# Patient Record
Sex: FEMALE | Race: WHITE | Employment: OTHER | URBAN - METROPOLITAN AREA
[De-identification: names, ages, dates, MRNs, and addresses within clinical notes are randomized per-mention and may not be internally consistent; named-entity substitution may affect disease eponyms.]

---

## 2018-04-25 ENCOUNTER — HOSPITAL ENCOUNTER (OUTPATIENT)
Dept: MAMMOGRAPHY | Age: 78
Discharge: HOME | End: 2018-04-27
Payer: MEDICARE

## 2018-04-25 ENCOUNTER — HOSPITAL ENCOUNTER (OUTPATIENT)
Dept: GENERAL RADIOLOGY | Age: 78
Discharge: HOME | End: 2018-04-27
Payer: MEDICARE

## 2018-04-25 ENCOUNTER — HOSPITAL ENCOUNTER (OUTPATIENT)
Age: 78
Discharge: HOME | End: 2018-04-27
Payer: MEDICARE

## 2018-04-25 DIAGNOSIS — M25.552 LEFT HIP PAIN: ICD-10-CM

## 2018-04-25 DIAGNOSIS — Z12.31 ENCOUNTER FOR SCREENING MAMMOGRAM FOR MALIGNANT NEOPLASM OF BREAST: ICD-10-CM

## 2018-04-25 PROCEDURE — 73502 X-RAY EXAM HIP UNI 2-3 VIEWS: CPT

## 2018-04-25 PROCEDURE — 77067 SCR MAMMO BI INCL CAD: CPT

## 2019-04-25 ENCOUNTER — HOSPITAL ENCOUNTER (OUTPATIENT)
Age: 79
Discharge: HOME | End: 2019-04-27
Payer: MEDICARE

## 2019-04-25 ENCOUNTER — HOSPITAL ENCOUNTER (OUTPATIENT)
Dept: MAMMOGRAPHY | Age: 79
Discharge: HOME | End: 2019-04-27
Payer: MEDICARE

## 2019-04-25 DIAGNOSIS — Z12.31 ENCOUNTER FOR SCREENING MAMMOGRAM FOR MALIGNANT NEOPLASM OF BREAST: ICD-10-CM

## 2019-04-25 PROCEDURE — 77067 SCR MAMMO BI INCL CAD: CPT

## 2019-04-29 ENCOUNTER — TELEPHONE (OUTPATIENT)
Dept: ONCOLOGY | Age: 79
End: 2019-04-29

## 2019-04-29 NOTE — TELEPHONE ENCOUNTER
Call to patient in reference to her mammogram performed at [address] [place] [address] on April 25, 2019. Instructed patient that the radiologist has recommended some additional breast imaging, in order to make a final determination/result. Informed her that a request for Rx has been faxed to the ordering physician. Once we receive the script a  from [facility] will contact her to schedule the additional imaging study/studies. Verbalizes understanding and is agreeable to proceed.        Gunjan Chairez RN, BSN, [address]

## 2019-05-02 ENCOUNTER — HOSPITAL ENCOUNTER (OUTPATIENT)
Dept: GENERAL RADIOLOGY | Age: 79
Discharge: HOME | End: 2019-05-04
Payer: MEDICARE

## 2019-05-02 DIAGNOSIS — R92.8 ABNORMAL MAMMOGRAM: ICD-10-CM

## 2019-05-02 DIAGNOSIS — N64.89 BREAST ASYMMETRY: ICD-10-CM

## 2019-05-02 PROCEDURE — 76642 ULTRASOUND BREAST LIMITED: CPT

## 2019-05-02 PROCEDURE — G0279 TOMOSYNTHESIS, MAMMO: HCPCS

## 2019-05-09 ENCOUNTER — HOSPITAL ENCOUNTER (OUTPATIENT)
Age: 79
Discharge: HOME | DRG: 481 | End: 2019-05-09
Payer: MEDICARE

## 2019-05-09 LAB
BUN BLDV-MCNC: 19 MG/DL (ref 8–23)
CREAT SERPL-MCNC: 0.7 MG/DL (ref 0.5–1)
GFR AFRICAN AMERICAN: >60
GFR NON-AFRICAN AMERICAN: >60 ML/MIN/1.73

## 2019-05-09 PROCEDURE — 36415 COLL VENOUS BLD VENIPUNCTURE: CPT

## 2019-05-09 PROCEDURE — 82565 ASSAY OF CREATININE: CPT

## 2019-05-09 PROCEDURE — 84520 ASSAY OF UREA NITROGEN: CPT

## 2019-05-11 ENCOUNTER — HOSPITAL ENCOUNTER (INPATIENT)
Age: 79
LOS: 3 days | Discharge: NURSING, REHAB OR LONG-TERM CARE | DRG: 481 | End: 2019-05-14
Attending: EMERGENCY MEDICINE | Admitting: INTERNAL MEDICINE
Payer: MEDICARE

## 2019-05-11 ENCOUNTER — ANESTHESIA EVENT (OUTPATIENT)
Dept: OPERATING ROOM | Age: 79
DRG: 481 | End: 2019-05-11
Payer: MEDICARE

## 2019-05-11 ENCOUNTER — APPOINTMENT (OUTPATIENT)
Dept: GENERAL RADIOLOGY | Age: 79
DRG: 481 | End: 2019-05-11
Payer: MEDICARE

## 2019-05-11 DIAGNOSIS — S72.141A CLOSED DISPLACED INTERTROCHANTERIC FRACTURE OF RIGHT FEMUR, INITIAL ENCOUNTER (HCC): Primary | ICD-10-CM

## 2019-05-11 PROBLEM — I10 HYPERTENSION: Chronic | Status: ACTIVE | Noted: 2019-05-11

## 2019-05-11 LAB
ABO/RH: NORMAL
ANION GAP SERPL CALCULATED.3IONS-SCNC: 12 MMOL/L (ref 7–16)
ANTIBODY SCREEN: NORMAL
BASOPHILS ABSOLUTE: 0.04 E9/L (ref 0–0.2)
BASOPHILS RELATIVE PERCENT: 0.5 % (ref 0–2)
BUN BLDV-MCNC: 21 MG/DL (ref 8–23)
CALCIUM SERPL-MCNC: 9.1 MG/DL (ref 8.6–10.2)
CHLORIDE BLD-SCNC: 100 MMOL/L (ref 98–107)
CO2: 25 MMOL/L (ref 22–29)
CREAT SERPL-MCNC: 0.6 MG/DL (ref 0.5–1)
EKG ATRIAL RATE: 73 BPM
EKG P AXIS: 66 DEGREES
EKG P-R INTERVAL: 152 MS
EKG Q-T INTERVAL: 408 MS
EKG QRS DURATION: 86 MS
EKG QTC CALCULATION (BAZETT): 449 MS
EKG R AXIS: 1 DEGREES
EKG T AXIS: 14 DEGREES
EKG VENTRICULAR RATE: 73 BPM
EOSINOPHILS ABSOLUTE: 0.08 E9/L (ref 0.05–0.5)
EOSINOPHILS RELATIVE PERCENT: 1.1 % (ref 0–6)
GFR AFRICAN AMERICAN: >60
GFR NON-AFRICAN AMERICAN: >60 ML/MIN/1.73
GLUCOSE BLD-MCNC: 109 MG/DL (ref 74–99)
HCT VFR BLD CALC: 46.1 % (ref 34–48)
HEMOGLOBIN: 15.3 G/DL (ref 11.5–15.5)
IMMATURE GRANULOCYTES #: 0.05 E9/L
IMMATURE GRANULOCYTES %: 0.7 % (ref 0–5)
INR BLD: 0.9
LYMPHOCYTES ABSOLUTE: 1.82 E9/L (ref 1.5–4)
LYMPHOCYTES RELATIVE PERCENT: 24 % (ref 20–42)
MCH RBC QN AUTO: 34.2 PG (ref 26–35)
MCHC RBC AUTO-ENTMCNC: 33.2 % (ref 32–34.5)
MCV RBC AUTO: 102.9 FL (ref 80–99.9)
MONOCYTES ABSOLUTE: 0.65 E9/L (ref 0.1–0.95)
MONOCYTES RELATIVE PERCENT: 8.6 % (ref 2–12)
NEUTROPHILS ABSOLUTE: 4.93 E9/L (ref 1.8–7.3)
NEUTROPHILS RELATIVE PERCENT: 65.1 % (ref 43–80)
PDW BLD-RTO: 13 FL (ref 11.5–15)
PLATELET # BLD: 302 E9/L (ref 130–450)
PMV BLD AUTO: 9.8 FL (ref 7–12)
POTASSIUM REFLEX MAGNESIUM: 5 MMOL/L (ref 3.5–5)
PROTHROMBIN TIME: 10.3 SEC (ref 9.3–12.4)
RBC # BLD: 4.48 E12/L (ref 3.5–5.5)
SODIUM BLD-SCNC: 137 MMOL/L (ref 132–146)
WBC # BLD: 7.6 E9/L (ref 4.5–11.5)

## 2019-05-11 PROCEDURE — 2580000003 HC RX 258: Performed by: INTERNAL MEDICINE

## 2019-05-11 PROCEDURE — 85025 COMPLETE CBC W/AUTO DIFF WBC: CPT

## 2019-05-11 PROCEDURE — 80048 BASIC METABOLIC PNL TOTAL CA: CPT

## 2019-05-11 PROCEDURE — 86850 RBC ANTIBODY SCREEN: CPT

## 2019-05-11 PROCEDURE — 6370000000 HC RX 637 (ALT 250 FOR IP): Performed by: ORTHOPAEDIC SURGERY

## 2019-05-11 PROCEDURE — 6360000002 HC RX W HCPCS: Performed by: ORTHOPAEDIC SURGERY

## 2019-05-11 PROCEDURE — 1200000000 HC SEMI PRIVATE

## 2019-05-11 PROCEDURE — 93005 ELECTROCARDIOGRAM TRACING: CPT | Performed by: STUDENT IN AN ORGANIZED HEALTH CARE EDUCATION/TRAINING PROGRAM

## 2019-05-11 PROCEDURE — 87081 CULTURE SCREEN ONLY: CPT

## 2019-05-11 PROCEDURE — 85610 PROTHROMBIN TIME: CPT

## 2019-05-11 PROCEDURE — 36415 COLL VENOUS BLD VENIPUNCTURE: CPT

## 2019-05-11 PROCEDURE — 6370000000 HC RX 637 (ALT 250 FOR IP): Performed by: INTERNAL MEDICINE

## 2019-05-11 PROCEDURE — 6360000002 HC RX W HCPCS: Performed by: EMERGENCY MEDICINE

## 2019-05-11 PROCEDURE — 86901 BLOOD TYPING SEROLOGIC RH(D): CPT

## 2019-05-11 PROCEDURE — 93010 ELECTROCARDIOGRAM REPORT: CPT | Performed by: INTERNAL MEDICINE

## 2019-05-11 PROCEDURE — 86900 BLOOD TYPING SEROLOGIC ABO: CPT

## 2019-05-11 PROCEDURE — 51702 INSERT TEMP BLADDER CATH: CPT

## 2019-05-11 PROCEDURE — 73502 X-RAY EXAM HIP UNI 2-3 VIEWS: CPT

## 2019-05-11 PROCEDURE — 99285 EMERGENCY DEPT VISIT HI MDM: CPT

## 2019-05-11 PROCEDURE — 71045 X-RAY EXAM CHEST 1 VIEW: CPT

## 2019-05-11 RX ORDER — HYDROCODONE BITARTRATE AND ACETAMINOPHEN 5; 325 MG/1; MG/1
0.5 TABLET ORAL EVERY 4 HOURS PRN
Status: DISCONTINUED | OUTPATIENT
Start: 2019-05-11 | End: 2019-05-14 | Stop reason: HOSPADM

## 2019-05-11 RX ORDER — SODIUM CHLORIDE 0.9 % (FLUSH) 0.9 %
10 SYRINGE (ML) INJECTION PRN
Status: DISCONTINUED | OUTPATIENT
Start: 2019-05-11 | End: 2019-05-14 | Stop reason: HOSPADM

## 2019-05-11 RX ORDER — MORPHINE SULFATE 2 MG/ML
2 INJECTION, SOLUTION INTRAMUSCULAR; INTRAVENOUS EVERY 4 HOURS PRN
Status: DISCONTINUED | OUTPATIENT
Start: 2019-05-11 | End: 2019-05-11

## 2019-05-11 RX ORDER — BUPROPION HYDROCHLORIDE 150 MG/1
150 TABLET ORAL EVERY MORNING
COMMUNITY

## 2019-05-11 RX ORDER — MORPHINE SULFATE 2 MG/ML
1 INJECTION, SOLUTION INTRAMUSCULAR; INTRAVENOUS
Status: DISCONTINUED | OUTPATIENT
Start: 2019-05-11 | End: 2019-05-14 | Stop reason: HOSPADM

## 2019-05-11 RX ORDER — MORPHINE SULFATE 2 MG/ML
4 INJECTION, SOLUTION INTRAMUSCULAR; INTRAVENOUS ONCE
Status: COMPLETED | OUTPATIENT
Start: 2019-05-11 | End: 2019-05-11

## 2019-05-11 RX ORDER — SODIUM CHLORIDE 0.9 % (FLUSH) 0.9 %
10 SYRINGE (ML) INJECTION EVERY 12 HOURS SCHEDULED
Status: DISCONTINUED | OUTPATIENT
Start: 2019-05-11 | End: 2019-05-14 | Stop reason: HOSPADM

## 2019-05-11 RX ORDER — AMLODIPINE BESYLATE 5 MG/1
5 TABLET ORAL DAILY
Status: DISCONTINUED | OUTPATIENT
Start: 2019-05-11 | End: 2019-05-13

## 2019-05-11 RX ORDER — LISINOPRIL 10 MG/1
10 TABLET ORAL DAILY
Status: DISCONTINUED | OUTPATIENT
Start: 2019-05-11 | End: 2019-05-12

## 2019-05-11 RX ORDER — BENAZEPRIL HYDROCHLORIDE 10 MG/1
10 TABLET ORAL DAILY
Status: ON HOLD | COMMUNITY
End: 2019-05-14 | Stop reason: HOSPADM

## 2019-05-11 RX ORDER — BUPROPION HYDROCHLORIDE 150 MG/1
150 TABLET ORAL EVERY MORNING
Status: DISCONTINUED | OUTPATIENT
Start: 2019-05-11 | End: 2019-05-14 | Stop reason: HOSPADM

## 2019-05-11 RX ORDER — BENAZEPRIL HYDROCHLORIDE 10 MG/1
10 TABLET ORAL DAILY
Status: DISCONTINUED | OUTPATIENT
Start: 2019-05-11 | End: 2019-05-11 | Stop reason: CLARIF

## 2019-05-11 RX ORDER — ACETAMINOPHEN 325 MG/1
650 TABLET ORAL EVERY 4 HOURS PRN
Status: DISCONTINUED | OUTPATIENT
Start: 2019-05-11 | End: 2019-05-14 | Stop reason: HOSPADM

## 2019-05-11 RX ORDER — SODIUM CHLORIDE 9 MG/ML
INJECTION, SOLUTION INTRAVENOUS CONTINUOUS
Status: DISCONTINUED | OUTPATIENT
Start: 2019-05-11 | End: 2019-05-14 | Stop reason: HOSPADM

## 2019-05-11 RX ORDER — ERGOCALCIFEROL 1.25 MG/1
50000 CAPSULE ORAL ONCE
Status: COMPLETED | OUTPATIENT
Start: 2019-05-11 | End: 2019-05-11

## 2019-05-11 RX ORDER — HYDROCODONE BITARTRATE AND ACETAMINOPHEN 5; 325 MG/1; MG/1
1 TABLET ORAL EVERY 4 HOURS PRN
Status: DISCONTINUED | OUTPATIENT
Start: 2019-05-11 | End: 2019-05-14 | Stop reason: HOSPADM

## 2019-05-11 RX ORDER — ONDANSETRON 2 MG/ML
4 INJECTION INTRAMUSCULAR; INTRAVENOUS EVERY 6 HOURS PRN
Status: DISCONTINUED | OUTPATIENT
Start: 2019-05-11 | End: 2019-05-14 | Stop reason: HOSPADM

## 2019-05-11 RX ORDER — MORPHINE SULFATE 2 MG/ML
2 INJECTION, SOLUTION INTRAMUSCULAR; INTRAVENOUS
Status: DISCONTINUED | OUTPATIENT
Start: 2019-05-11 | End: 2019-05-14 | Stop reason: HOSPADM

## 2019-05-11 RX ORDER — AMLODIPINE BESYLATE 5 MG/1
5 TABLET ORAL DAILY
COMMUNITY

## 2019-05-11 RX ADMIN — BUPROPION HYDROCHLORIDE 150 MG: 150 TABLET, FILM COATED, EXTENDED RELEASE ORAL at 13:06

## 2019-05-11 RX ADMIN — SODIUM CHLORIDE: 9 INJECTION, SOLUTION INTRAVENOUS at 13:06

## 2019-05-11 RX ADMIN — ERGOCALCIFEROL 50000 UNITS: 1.25 CAPSULE ORAL at 21:16

## 2019-05-11 RX ADMIN — MORPHINE SULFATE 4 MG: 2 INJECTION, SOLUTION INTRAMUSCULAR; INTRAVENOUS at 10:34

## 2019-05-11 RX ADMIN — HYDROCODONE BITARTRATE AND ACETAMINOPHEN 1 TABLET: 5; 325 TABLET ORAL at 15:24

## 2019-05-11 RX ADMIN — MORPHINE SULFATE 2 MG: 2 INJECTION, SOLUTION INTRAMUSCULAR; INTRAVENOUS at 23:57

## 2019-05-11 RX ADMIN — HYDROCODONE BITARTRATE AND ACETAMINOPHEN 1 TABLET: 5; 325 TABLET ORAL at 20:33

## 2019-05-11 RX ADMIN — AMLODIPINE BESYLATE 5 MG: 5 TABLET ORAL at 13:06

## 2019-05-11 RX ADMIN — LISINOPRIL 10 MG: 10 TABLET ORAL at 13:06

## 2019-05-11 RX ADMIN — SODIUM CHLORIDE: 9 INJECTION, SOLUTION INTRAVENOUS at 23:48

## 2019-05-11 SDOH — HEALTH STABILITY: MENTAL HEALTH: HOW OFTEN DO YOU HAVE A DRINK CONTAINING ALCOHOL?: NEVER

## 2019-05-11 ASSESSMENT — PAIN DESCRIPTION - FREQUENCY
FREQUENCY: CONTINUOUS
FREQUENCY: INTERMITTENT
FREQUENCY: CONTINUOUS
FREQUENCY: CONTINUOUS

## 2019-05-11 ASSESSMENT — PAIN DESCRIPTION - PAIN TYPE
TYPE: ACUTE PAIN

## 2019-05-11 ASSESSMENT — PAIN DESCRIPTION - LOCATION
LOCATION: HIP

## 2019-05-11 ASSESSMENT — ENCOUNTER SYMPTOMS
BACK PAIN: 0
DIARRHEA: 0
FACIAL SWELLING: 0
PHOTOPHOBIA: 0
NAUSEA: 0
CHEST TIGHTNESS: 0
VOMITING: 0
ABDOMINAL PAIN: 0
ABDOMINAL DISTENTION: 0
SHORTNESS OF BREATH: 0

## 2019-05-11 ASSESSMENT — PAIN DESCRIPTION - DESCRIPTORS
DESCRIPTORS: ACHING;DISCOMFORT;PRESSURE
DESCRIPTORS: DISCOMFORT;SHARP

## 2019-05-11 ASSESSMENT — PAIN DESCRIPTION - ONSET
ONSET: ON-GOING

## 2019-05-11 ASSESSMENT — PAIN - FUNCTIONAL ASSESSMENT
PAIN_FUNCTIONAL_ASSESSMENT: PREVENTS OR INTERFERES SOME ACTIVE ACTIVITIES AND ADLS

## 2019-05-11 ASSESSMENT — PAIN SCALES - GENERAL
PAINLEVEL_OUTOF10: 7
PAINLEVEL_OUTOF10: 6
PAINLEVEL_OUTOF10: 3
PAINLEVEL_OUTOF10: 0
PAINLEVEL_OUTOF10: 10
PAINLEVEL_OUTOF10: 2
PAINLEVEL_OUTOF10: 0
PAINLEVEL_OUTOF10: 6

## 2019-05-11 ASSESSMENT — PAIN DESCRIPTION - ORIENTATION
ORIENTATION: RIGHT

## 2019-05-11 ASSESSMENT — PAIN DESCRIPTION - PROGRESSION
CLINICAL_PROGRESSION: GRADUALLY IMPROVING
CLINICAL_PROGRESSION: GRADUALLY WORSENING
CLINICAL_PROGRESSION: GRADUALLY WORSENING

## 2019-05-11 NOTE — ED PROVIDER NOTES
Delfino Woodward is an [de-identified]year old female who presents by EMS following mechanical fall outside eye clinic. Patient was walking with her  when she tripped over step and fell on her right hip. Patient is having pain at right hip with leg movement. Patient does not have pain at rest. Patient was not given pain medication. Patient has a history of hypertension. Patient is not on anticoagulation. Patient did not lose consciousness or hit her head. Patient denies any nausea vomiting. The history is provided by the patient, the spouse and the EMS personnel. Fall   The accident occurred less than 1 hour ago. The fall occurred while walking. She fell from a height of 3 to 5 ft. She landed on concrete. The point of impact was the right hip. The pain is present in the right hip. The pain is at a severity of 2/10. The pain is mild. She was not ambulatory at the scene. There was no entrapment after the fall. There was no drug use involved in the accident. There was no alcohol use involved in the accident. Pertinent negatives include no fever, no numbness, no abdominal pain, no nausea, no vomiting, no headaches and no loss of consciousness. The symptoms are aggravated by pressure on the injury and use of the injured limb. Treatment on scene includes a backboard. She has tried immobilization for the symptoms. The treatment provided moderate relief. Review of Systems   Constitutional: Negative for chills, diaphoresis, fever and unexpected weight change. HENT: Negative for congestion and facial swelling. Eyes: Negative for photophobia and visual disturbance. Respiratory: Negative for chest tightness and shortness of breath. Cardiovascular: Negative for chest pain, palpitations and leg swelling. Gastrointestinal: Negative for abdominal distention, abdominal pain, diarrhea, nausea and vomiting. Genitourinary: Negative for difficulty urinating, dysuria and flank pain.    Musculoskeletal: Positive for arthralgias. Negative for back pain, neck pain and neck stiffness. Neurological: Negative for dizziness, loss of consciousness, light-headedness, numbness and headaches. Physical Exam   Constitutional: She is oriented to person, place, and time. She appears well-developed and well-nourished. No distress. HENT:   Head: Normocephalic and atraumatic. Right Ear: External ear normal.   Left Ear: External ear normal.   Mouth/Throat: Oropharynx is clear and moist.   Eyes: Pupils are equal, round, and reactive to light. Conjunctivae and EOM are normal.   Neck: Neck supple. Cardiovascular: Normal rate and regular rhythm. Pulses:       Dorsalis pedis pulses are 2+ on the right side, and 2+ on the left side. Posterior tibial pulses are 2+ on the right side, and 2+ on the left side. Pulmonary/Chest: Effort normal and breath sounds normal. No respiratory distress. She has no wheezes. She exhibits no tenderness. Abdominal: Soft. Bowel sounds are normal. She exhibits no distension. There is no tenderness. There is no rebound and no guarding. Musculoskeletal: She exhibits tenderness and deformity. She exhibits no edema. Right leg shortened and externally rotated  No pain to palpation of right knee or ankle  Pulses, sensory, and motor intact bilaterally  No LE edema   Neurological: She is alert and oriented to person, place, and time. No cranial nerve deficit or sensory deficit. Skin: Skin is warm and dry. Capillary refill takes less than 2 seconds. She is not diaphoretic. Psychiatric: She has a normal mood and affect. Her behavior is normal.   Nursing note and vitals reviewed. Procedures    MDM  Number of Diagnoses or Management Options  Closed displaced intertrochanteric fracture of right femur, initial encounter [facility]):   Diagnosis management comments: Joey Cunha presented with right leg deformity and pain following fall.  Patient was found to have comminuted intertrochanteric fx of right proximal femur. Patient will be admitted to medicine. Ortho consulted. Discussed results and plan with patient, daughter, and . Patient's daughter works at [id number] and initially wanted patient transferred. Daughter spoke with staff at [place] [id number] and decided to have her mother stay for admission at [facility]. Patient did not want pain medication. Questions were answered and XR reviewed with family. ED Course as of May 11 0952   Sat May 11, 2019   7840 Patient did not want pain medication    [SS]   3859 Discussed results with patient, she understands, patient does not want pain medication    [SS]      ED Course User Index  [SS] Maira Corona MD       --------------------------------------------- PAST HISTORY ---------------------------------------------  Past Medical History:  has a past medical history of Depression and Hypertension. Past Surgical History:  has no past surgical history on file. Social History:  reports that she has never smoked. She has never used smokeless tobacco. She reports that she does not drink alcohol or use drugs. Family History: family history is not on file. The patients home medications have been reviewed. Allergies: Patient has no known allergies.     -------------------------------------------------- RESULTS -------------------------------------------------    LABS:  Results for orders placed or performed during the hospital encounter of 05/11/19   CBC Auto Differential   Result Value Ref Range    WBC 7.6 4.5 - 11.5 E9/L    RBC 4.48 3.50 - 5.50 E12/L    Hemoglobin 15.3 11.5 - 15.5 g/dL    Hematocrit 46.1 34.0 - 48.0 %    .9 (H) 80.0 - 99.9 fL    MCH 34.2 26.0 - 35.0 pg    MCHC 33.2 32.0 - 34.5 %    RDW 13.0 11.5 - 15.0 fL    Platelets 639 243 - 496 E9/L    MPV 9.8 7.0 - 12.0 fL    Neutrophils % 65.1 43.0 - 80.0 %    Immature Granulocytes % 0.7 0.0 - 5.0 %    Lymphocytes % 24.0 20.0 - 42.0 %    Monocytes % 8.6 2.0 - 12.0 %    Eosinophils % 1.1 0.0 - 6.0 %    Basophils % 0.5 0.0 - 2.0 %    Neutrophils # 4.93 1.80 - 7.30 E9/L    Immature Granulocytes # 0.05 E9/L    Lymphocytes # 1.82 1.50 - 4.00 E9/L    Monocytes # 0.65 0.10 - 0.95 E9/L    Eosinophils # 0.08 0.05 - 0.50 E9/L    Basophils # 0.04 0.00 - 0.20 E9/L   EKG 12 Lead   Result Value Ref Range    Ventricular Rate 73 BPM    Atrial Rate 73 BPM    P-R Interval 152 ms    QRS Duration 86 ms    Q-T Interval 408 ms    QTc Calculation (Bazett) 449 ms    P Axis 66 degrees    R Axis 1 degrees    T Axis 14 degrees       RADIOLOGY:  XR HIP 2-3 VW W PELVIS RIGHT   Final Result   ALERT:  THIS IS AN ABNORMAL REPORT    1. Comminuted intertrochanteric fracture of the right proximal femur   with shearing of the lesser trochanter and with superior and lateral   distal fracture fragment migration         XR CHEST 1 VW   Final Result   Right hemidiaphragmatic elevation without otherwise   radiographic evidence of acute cardiopulmonary disease. Vascular   calcifications thoracic aorta. EKG: This EKG is signed and interpreted by me. Rate: 73  Rhythm: Sinus  Interpretation: non-specific EKG, possible left atrial enlargement  Comparison: no previous EKG      ------------------------- NURSING NOTES AND VITALS REVIEWED ---------------------------  Date / Time Roomed:  5/11/2019  8:34 AM  ED Bed Assignment:  26/26    The nursing notes within the ED encounter and vital signs as below have been reviewed.      Patient Vitals for the past 24 hrs:   BP Temp Temp src Pulse Resp SpO2 Height Weight   05/11/19 0842 121/88 97.8 °F (36.6 °C) Oral 74 20 98 % 5' 2\" (1.575 m) 130 lb (59 kg)       Oxygen Saturation Interpretation: Normal    ------------------------------------------ PROGRESS NOTES ------------------------------------------  Re-evaluation(s):  Time: 950am  Patients symptoms show no change  Repeat physical examination is not changed    Counseling:  I have spoken with the patient and discussed todays results, in addition to providing specific details for the plan of care and counseling regarding the diagnosis and prognosis. Their questions are answered at this time and they are agreeable with the plan of admission.    --------------------------------- ADDITIONAL PROVIDER NOTES ---------------------------------  Consultations:  Spoke with Dr. Shawn Vail. Discussed case. They will admit the patient. This patient's ED course included: a personal history and physicial examination, re-evaluation prior to disposition and multiple bedside re-evaluations    This patient has remained hemodynamically stable during their ED course. Diagnosis:  1. Closed displaced intertrochanteric fracture of right femur, initial encounter ([facility] [address]        Disposition:  Patient's disposition: Admit to med/surg floor  Patient's condition is stable.               Christian Walsh MD  05/12/19 9427

## 2019-05-11 NOTE — ANESTHESIA PRE PROCEDURE
Department of Anesthesiology  Preprocedure Note       Name:  Davion Saldivar   Age:  [de-identified] y.o.  :  1940                                          MRN:  [id number]         Date:  2019      Surgeon: Marilyn Raya):  Norma Orozco MD    Procedure: HIP OPEN REDUCTION INTERNAL FIXATION (Right )    Medications prior to admission:   Prior to Admission medications    Medication Sig Start Date End Date Taking?  Authorizing Provider   benazepril (LOTENSIN) 10 MG tablet Take 10 mg by mouth daily   Yes Historical Provider, MD   amLODIPine (NORVASC) 5 MG tablet Take 5 mg by mouth daily   Yes Historical Provider, MD   buPROPion (WELLBUTRIN XL) 150 MG extended release tablet Take 150 mg by mouth every morning   Yes Historical Provider, MD       Current medications:    Current Facility-Administered Medications   Medication Dose Route Frequency Provider Last Rate Last Dose    amLODIPine (NORVASC) tablet 5 mg  5 mg Oral Daily Cande Shields, DO   5 mg at 19 1306    buPROPion (WELLBUTRIN XL) extended release tablet 150 mg  150 mg Oral QAM Cande Shields, DO   150 mg at 19 1306    sodium chloride flush 0.9 % injection 10 mL  10 mL Intravenous 2 times per day Cande Shields, DO        sodium chloride flush 0.9 % injection 10 mL  10 mL Intravenous PRN Cande Shields, DO        magnesium hydroxide (MILK OF MAGNESIA) 400 MG/5ML suspension 30 mL  30 mL Oral Daily PRN Cande Shields, DO        ondansetron [facility]) injection 4 mg  4 mg Intravenous Q6H PRN Cande Shields, DO        0.9 % sodium chloride infusion   Intravenous Continuous Cande Shields, DO 75 mL/hr at 19 1306      acetaminophen (TYLENOL) tablet 650 mg  650 mg Oral Q4H PRN Cande Shields, DO        HYDROcodone-acetaminophen (NORCO) 5-325 MG per tablet 1 tablet  1 tablet Oral Q4H PRN Cande Shields, DO   1 tablet at 19 1524    lisinopril (PRINIVIL;ZESTRIL) tablet 10 mg  10 mg Oral Daily Cande Shields, DO   10 mg at 19 1306    HYDROcodone-acetaminophen (NORCO) 5-325 MG per tablet 0.5 tablet  0.5 tablet Oral Q4H PRN Viktoria Lee MD        morphine (PF) injection 2 mg  2 mg Intravenous Q3H PRN Viktoria Lee MD        morphine (PF) injection 1 mg  1 mg Intravenous Q3H PRN Viktoria Lee MD        vitamin D (ERGOCALCIFEROL) capsule 50,000 Units  50,000 Units Oral Once Viktoria Lee MD        ceFAZolin (ANCEF) 2 g in dextrose 5 % 100 mL IVPB  2 g Intravenous See Admin Instructions Viktoria Lee MD           Allergies:  No Known Allergies    Problem List:    Patient Active Problem List   Diagnosis Code    [facility] right femur[facility] S72.141A    Hypertension I10       Past Medical History:        Diagnosis Date    Depression     Hypertension        Past Surgical History:  History reviewed. No pertinent surgical history. Social History:    Social History     Tobacco Use    Smoking status: Never Smoker    Smokeless tobacco: Never Used   Substance Use Topics    Alcohol use: Never     Frequency: Never                                Counseling given: Not Answered      Vital Signs (Current):   Vitals:    05/11/19 0842 05/11/19 1027 05/11/19 1230   BP: 121/88 133/79 136/74   Pulse: 74 73 70   Resp: 20 16 16   Temp: 97.8 °F (36.6 °C) 98.1 °F (36.7 °C) 98.3 °F (36.8 °C)   TempSrc: Oral Oral    SpO2: 98% 95% 99%   Weight: 130 lb (59 kg)     Height: 5' 2\" (1.575 m)                                                BP Readings from Last 3 Encounters:   05/11/19 136/74       NPO Status:  greater than 8 hours                                                                               BMI:   Wt Readings from Last 3 Encounters:   05/11/19 130 lb (59 kg)     Body mass index is 23.78 kg/m².     CBC:   Lab Results   Component Value Date    WBC 7.6 05/11/2019    RBC 4.48 05/11/2019    HGB 15.3 05/11/2019    HCT 46.1 05/11/2019    .9 05/11/2019    RDW 13.0 05/11/2019     05/11/2019

## 2019-05-11 NOTE — H&P
[address] [address]                              HISTORY AND PHYSICAL    PATIENT NAME: Sammy Carmona                      :        1940  MED REC NO:   [id number]                            ROOM:       0228  ACCOUNT NO:   [de-identified]                           ADMIT DATE: 2019  PROVIDER:     Ron Wood DO    DATE OF ADMISSION:  2019. CHIEF COMPLAINT:  Right hip pain, status post fall. HISTORY OF PRESENT ILLNESS:  The patient is an 78-year-old   female, who was at [facility]. She fell getting out of the  car. She landed on the grass. She was seen in the emergency room. She  was noted to have a right intertrochanteric fracture of the hip. She  was admitted for further evaluation and treatment. PRIMARY CARE PHYSICIAN:  Tone Adams DO    MEDICATIONS PRIOR TO ADMISSION:  Lotensin, Norvasc, and Wellbutrin XL. PAST MEDICAL HISTORY:  Hypertension and depression. PAST SURGICAL HISTORY:  Tonsillectomy and bilateral eye cataract  surgery. REVIEW OF SYSTEMS:  Remarkable for above-stated chief complaint plus  allergies none known. SOCIAL HISTORY:  No tobacco.  No alcohol. PHYSICAL EXAMINATION:  GENERAL APPEARANCE:  Physical exam reveals an 78-year-old   female, who is alert and oriented x3, cooperative, and a good historian. VITAL SIGNS:  On admission as per emergency room record, which are  notable for a temperature of 97.8, pulse 74, respiration 20, blood  pressure 121/88. HEENT:  Head:  Normocephalic and atraumatic. Eyes:  Pupils equal and  reactive to light. Extraocular muscles intact. Fundi not well  visualized. Nose:  No obstruction, polyp, or discharge noted. Mouth:   Mucosa without lesion. Pharynx:  Non-injectable without exudate. NECK:  Supple. No JVD. No thyromegaly. No carotid bruits.   HEART:  Regular rate and rhythm without murmur. LUNGS:  Clear to auscultation bilaterally. ABDOMEN:  Positive bowel sounds. Soft and nontender. BACK:  Increased thoracic kyphosis. EXTREMITIES:  With minimal external rotation to the right lower  extremity. LYMPH NODES:  No adenopathy noticed. SKIN:  Without rash or lesions. IMPRESSION:  Right hip intertrochanteric fracture, hypertension, and  depression. PLAN:  Admit. Pain control. Ortho to see. DISCHARGE PLAN:  Home when stable versus rehab.         Yoni Kerr DO    D: 05/11/2019 12:40:51       T: 05/11/2019 13:44:31     [id number]  Job#: [id number]     Doc#: [id number]    CC:

## 2019-05-11 NOTE — PROGRESS NOTES
Dr. Janett Block called @ [phone]. Requested he be added to Treatment Team as he will be performing pt surgery on 05/12/19.  Added to Treatment Team.

## 2019-05-11 NOTE — CONSULTS
Cc r hip pain    Dx r intertroch fracture    Assuming orthopaedic care from Dr. Shyla Ceja internal fixation of right hip once medically optimizied  Will be wbat post op  Pre op orders placed    See dictated Note. Thank Giovanny Boykin MD    Patient seen and examined  No other injuries found    History reviewed. No pertinent surgical history. Past Medical History:   Diagnosis Date    Depression     Hypertension      Scheduled Meds:   amLODIPine  5 mg Oral Daily    buPROPion  150 mg Oral QAM    sodium chloride flush  10 mL Intravenous 2 times per day    lisinopril  10 mg Oral Daily     Continuous Infusions:   sodium chloride 75 mL/hr at 05/11/19 1306     PRN Meds:.sodium chloride flush, magnesium hydroxide, ondansetron, acetaminophen, HYDROcodone 5 mg - acetaminophen, morphine  . No Known Allergies    etoh neg  tob neg    Ros Denies change in bowel or bladder habits. Denies unexpected weight loss. Denies temp    famhx positive htn and dm,  Neg for cad and ca    Vitals:    05/11/19 1230   BP: 136/74   Pulse: 70   Resp: 16   Temp: 98.3 °F (36.8 °C)   SpO2: 99%     Risk, benefits and options discussed with patient. she has verbalized understanding of options. The possibility of complications were also discussed to include but not limited to nerve damage, infection, problems with healing, vascular injury, chronic pain, stiffness, dysfunction, repeat surgery, symptomatic hardware and or its failure, dislocation, and blood clot. Post-op care, work, activity and restrictions were also discussed with patient and they verbalized and agreed with the restrictions.

## 2019-05-12 ENCOUNTER — ANESTHESIA (OUTPATIENT)
Dept: OPERATING ROOM | Age: 79
DRG: 481 | End: 2019-05-12
Payer: MEDICARE

## 2019-05-12 ENCOUNTER — APPOINTMENT (OUTPATIENT)
Dept: GENERAL RADIOLOGY | Age: 79
DRG: 481 | End: 2019-05-12
Payer: MEDICARE

## 2019-05-12 VITALS — OXYGEN SATURATION: 100 % | SYSTOLIC BLOOD PRESSURE: 163 MMHG | TEMPERATURE: 95.4 F | DIASTOLIC BLOOD PRESSURE: 92 MMHG

## 2019-05-12 LAB
ANION GAP SERPL CALCULATED.3IONS-SCNC: 13 MMOL/L (ref 7–16)
BUN BLDV-MCNC: 22 MG/DL (ref 8–23)
CALCIUM SERPL-MCNC: 8.7 MG/DL (ref 8.6–10.2)
CHLORIDE BLD-SCNC: 96 MMOL/L (ref 98–107)
CO2: 25 MMOL/L (ref 22–29)
CREAT SERPL-MCNC: 0.5 MG/DL (ref 0.5–1)
GFR AFRICAN AMERICAN: >60
GFR NON-AFRICAN AMERICAN: >60 ML/MIN/1.73
GLUCOSE BLD-MCNC: 211 MG/DL (ref 74–99)
POTASSIUM SERPL-SCNC: 4.7 MMOL/L (ref 3.5–5)
SODIUM BLD-SCNC: 134 MMOL/L (ref 132–146)

## 2019-05-12 PROCEDURE — 73552 X-RAY EXAM OF FEMUR 2/>: CPT

## 2019-05-12 PROCEDURE — 6360000002 HC RX W HCPCS: Performed by: NURSE ANESTHETIST, CERTIFIED REGISTERED

## 2019-05-12 PROCEDURE — C1769 GUIDE WIRE: HCPCS | Performed by: ORTHOPAEDIC SURGERY

## 2019-05-12 PROCEDURE — 97116 GAIT TRAINING THERAPY: CPT

## 2019-05-12 PROCEDURE — 3700000000 HC ANESTHESIA ATTENDED CARE: Performed by: ORTHOPAEDIC SURGERY

## 2019-05-12 PROCEDURE — 6360000002 HC RX W HCPCS: Performed by: ANESTHESIOLOGY

## 2019-05-12 PROCEDURE — 3700000001 HC ADD 15 MINUTES (ANESTHESIA): Performed by: ORTHOPAEDIC SURGERY

## 2019-05-12 PROCEDURE — 2580000003 HC RX 258: Performed by: INTERNAL MEDICINE

## 2019-05-12 PROCEDURE — 7100000000 HC PACU RECOVERY - FIRST 15 MIN: Performed by: ORTHOPAEDIC SURGERY

## 2019-05-12 PROCEDURE — 2720000010 HC SURG SUPPLY STERILE: Performed by: ORTHOPAEDIC SURGERY

## 2019-05-12 PROCEDURE — C1713 ANCHOR/SCREW BN/BN,TIS/BN: HCPCS | Performed by: ORTHOPAEDIC SURGERY

## 2019-05-12 PROCEDURE — 6370000000 HC RX 637 (ALT 250 FOR IP): Performed by: ORTHOPAEDIC SURGERY

## 2019-05-12 PROCEDURE — 6360000002 HC RX W HCPCS: Performed by: ORTHOPAEDIC SURGERY

## 2019-05-12 PROCEDURE — 3209999900 FLUORO FOR SURGICAL PROCEDURES

## 2019-05-12 PROCEDURE — 2500000003 HC RX 250 WO HCPCS: Performed by: ORTHOPAEDIC SURGERY

## 2019-05-12 PROCEDURE — 97530 THERAPEUTIC ACTIVITIES: CPT

## 2019-05-12 PROCEDURE — 1200000000 HC SEMI PRIVATE

## 2019-05-12 PROCEDURE — 97161 PT EVAL LOW COMPLEX 20 MIN: CPT

## 2019-05-12 PROCEDURE — 2580000003 HC RX 258: Performed by: NURSE ANESTHETIST, CERTIFIED REGISTERED

## 2019-05-12 PROCEDURE — 80048 BASIC METABOLIC PNL TOTAL CA: CPT

## 2019-05-12 PROCEDURE — 2500000003 HC RX 250 WO HCPCS: Performed by: NURSE ANESTHETIST, CERTIFIED REGISTERED

## 2019-05-12 PROCEDURE — 6370000000 HC RX 637 (ALT 250 FOR IP): Performed by: INTERNAL MEDICINE

## 2019-05-12 PROCEDURE — 0QS636Z REPOSITION RIGHT UPPER FEMUR WITH INTRAMEDULLARY INTERNAL FIXATION DEVICE, PERCUTANEOUS APPROACH: ICD-10-PCS | Performed by: ORTHOPAEDIC SURGERY

## 2019-05-12 PROCEDURE — 36415 COLL VENOUS BLD VENIPUNCTURE: CPT

## 2019-05-12 PROCEDURE — 2709999900 HC NON-CHARGEABLE SUPPLY: Performed by: ORTHOPAEDIC SURGERY

## 2019-05-12 PROCEDURE — 7100000001 HC PACU RECOVERY - ADDTL 15 MIN: Performed by: ORTHOPAEDIC SURGERY

## 2019-05-12 PROCEDURE — 3600000003 HC SURGERY LEVEL 3 BASE: Performed by: ORTHOPAEDIC SURGERY

## 2019-05-12 PROCEDURE — 3600000013 HC SURGERY LEVEL 3 ADDTL 15MIN: Performed by: ORTHOPAEDIC SURGERY

## 2019-05-12 DEVICE — LOCKING SCREW, FULLY THREADED: Type: IMPLANTABLE DEVICE | Site: FEMUR | Status: FUNCTIONAL

## 2019-05-12 DEVICE — LONG NAIL KIT R1.5, TI, RIGHT
Type: IMPLANTABLE DEVICE | Site: FEMUR | Status: FUNCTIONAL
Brand: GAMMA

## 2019-05-12 DEVICE — LAG SCREW, TI
Type: IMPLANTABLE DEVICE | Site: FEMUR | Status: FUNCTIONAL
Brand: GAMMA

## 2019-05-12 RX ORDER — DOCUSATE SODIUM 100 MG/1
100 CAPSULE, LIQUID FILLED ORAL DAILY
Status: DISCONTINUED | OUTPATIENT
Start: 2019-05-12 | End: 2019-05-14 | Stop reason: HOSPADM

## 2019-05-12 RX ORDER — PROPOFOL 10 MG/ML
INJECTION, EMULSION INTRAVENOUS PRN
Status: DISCONTINUED | OUTPATIENT
Start: 2019-05-12 | End: 2019-05-12 | Stop reason: SDUPTHER

## 2019-05-12 RX ORDER — ASPIRIN 81 MG/1
81 TABLET ORAL DAILY
Status: DISCONTINUED | OUTPATIENT
Start: 2019-05-13 | End: 2019-05-14 | Stop reason: HOSPADM

## 2019-05-12 RX ORDER — ONDANSETRON 2 MG/ML
INJECTION INTRAMUSCULAR; INTRAVENOUS PRN
Status: DISCONTINUED | OUTPATIENT
Start: 2019-05-12 | End: 2019-05-12 | Stop reason: SDUPTHER

## 2019-05-12 RX ORDER — OXYCODONE HYDROCHLORIDE AND ACETAMINOPHEN 5; 325 MG/1; MG/1
1 TABLET ORAL
Status: DISCONTINUED | OUTPATIENT
Start: 2019-05-12 | End: 2019-05-12 | Stop reason: HOSPADM

## 2019-05-12 RX ORDER — FENTANYL CITRATE 50 UG/ML
50 INJECTION, SOLUTION INTRAMUSCULAR; INTRAVENOUS EVERY 5 MIN PRN
Status: DISCONTINUED | OUTPATIENT
Start: 2019-05-12 | End: 2019-05-12 | Stop reason: HOSPADM

## 2019-05-12 RX ORDER — DIPHENHYDRAMINE HYDROCHLORIDE 50 MG/ML
12.5 INJECTION INTRAMUSCULAR; INTRAVENOUS
Status: DISCONTINUED | OUTPATIENT
Start: 2019-05-12 | End: 2019-05-12 | Stop reason: HOSPADM

## 2019-05-12 RX ORDER — LIDOCAINE HYDROCHLORIDE 20 MG/ML
INJECTION, SOLUTION EPIDURAL; INFILTRATION; INTRACAUDAL; PERINEURAL PRN
Status: DISCONTINUED | OUTPATIENT
Start: 2019-05-12 | End: 2019-05-12 | Stop reason: SDUPTHER

## 2019-05-12 RX ORDER — HYDROCODONE BITARTRATE AND ACETAMINOPHEN 5; 325 MG/1; MG/1
.5-1 TABLET ORAL EVERY 6 HOURS PRN
Qty: 28 TABLET | Refills: 0 | Status: SHIPPED | OUTPATIENT
Start: 2019-05-12 | End: 2019-05-19

## 2019-05-12 RX ORDER — CEFAZOLIN SODIUM 1 G/50ML
1 SOLUTION INTRAVENOUS EVERY 8 HOURS
Status: COMPLETED | OUTPATIENT
Start: 2019-05-12 | End: 2019-05-13

## 2019-05-12 RX ORDER — LIDOCAINE HYDROCHLORIDE ANHYDROUS AND DEXTROSE MONOHYDRATE .4; 5 G/100ML; G/100ML
2 INJECTION, SOLUTION INTRAVENOUS CONTINUOUS
Status: DISCONTINUED | OUTPATIENT
Start: 2019-05-12 | End: 2019-05-12

## 2019-05-12 RX ORDER — MEPERIDINE HYDROCHLORIDE 25 MG/ML
12.5 INJECTION INTRAMUSCULAR; INTRAVENOUS; SUBCUTANEOUS EVERY 5 MIN PRN
Status: DISCONTINUED | OUTPATIENT
Start: 2019-05-12 | End: 2019-05-12 | Stop reason: HOSPADM

## 2019-05-12 RX ORDER — BUPIVACAINE HYDROCHLORIDE AND EPINEPHRINE 2.5; 5 MG/ML; UG/ML
INJECTION, SOLUTION EPIDURAL; INFILTRATION; INTRACAUDAL; PERINEURAL PRN
Status: DISCONTINUED | OUTPATIENT
Start: 2019-05-12 | End: 2019-05-12 | Stop reason: ALTCHOICE

## 2019-05-12 RX ORDER — DEXAMETHASONE SODIUM PHOSPHATE 4 MG/ML
INJECTION, SOLUTION INTRA-ARTICULAR; INTRALESIONAL; INTRAMUSCULAR; INTRAVENOUS; SOFT TISSUE PRN
Status: DISCONTINUED | OUTPATIENT
Start: 2019-05-12 | End: 2019-05-12 | Stop reason: SDUPTHER

## 2019-05-12 RX ORDER — SODIUM CHLORIDE, SODIUM LACTATE, POTASSIUM CHLORIDE, CALCIUM CHLORIDE 600; 310; 30; 20 MG/100ML; MG/100ML; MG/100ML; MG/100ML
INJECTION, SOLUTION INTRAVENOUS CONTINUOUS PRN
Status: DISCONTINUED | OUTPATIENT
Start: 2019-05-12 | End: 2019-05-12 | Stop reason: SDUPTHER

## 2019-05-12 RX ORDER — LATANOPROST 50 UG/ML
1 SOLUTION/ DROPS OPHTHALMIC DAILY
COMMUNITY

## 2019-05-12 RX ORDER — ASPIRIN 81 MG/1
81 TABLET ORAL DAILY
Qty: 30 TABLET | Refills: 0 | Status: SHIPPED | OUTPATIENT
Start: 2019-05-12

## 2019-05-12 RX ORDER — FENTANYL CITRATE 50 UG/ML
25 INJECTION, SOLUTION INTRAMUSCULAR; INTRAVENOUS EVERY 5 MIN PRN
Status: DISCONTINUED | OUTPATIENT
Start: 2019-05-12 | End: 2019-05-12 | Stop reason: HOSPADM

## 2019-05-12 RX ORDER — PROMETHAZINE HYDROCHLORIDE 25 MG/ML
6.25 INJECTION, SOLUTION INTRAMUSCULAR; INTRAVENOUS
Status: DISCONTINUED | OUTPATIENT
Start: 2019-05-12 | End: 2019-05-12 | Stop reason: HOSPADM

## 2019-05-12 RX ORDER — BUPIVACAINE HYDROCHLORIDE 7.5 MG/ML
INJECTION, SOLUTION INTRASPINAL PRN
Status: DISCONTINUED | OUTPATIENT
Start: 2019-05-12 | End: 2019-05-12 | Stop reason: SDUPTHER

## 2019-05-12 RX ORDER — PROPOFOL 10 MG/ML
INJECTION, EMULSION INTRAVENOUS CONTINUOUS PRN
Status: DISCONTINUED | OUTPATIENT
Start: 2019-05-12 | End: 2019-05-12 | Stop reason: SDUPTHER

## 2019-05-12 RX ORDER — KETAMINE HYDROCHLORIDE 10 MG/ML
INJECTION, SOLUTION INTRAMUSCULAR; INTRAVENOUS PRN
Status: DISCONTINUED | OUTPATIENT
Start: 2019-05-12 | End: 2019-05-12 | Stop reason: SDUPTHER

## 2019-05-12 RX ORDER — LATANOPROST 50 UG/ML
1 SOLUTION/ DROPS OPHTHALMIC DAILY
Status: DISCONTINUED | OUTPATIENT
Start: 2019-05-12 | End: 2019-05-14 | Stop reason: HOSPADM

## 2019-05-12 RX ADMIN — BUPIVACAINE HYDROCHLORIDE IN DEXTROSE 15 MG: 7.5 INJECTION, SOLUTION SUBARACHNOID at 07:54

## 2019-05-12 RX ADMIN — PROPOFOL 20 MG: 10 INJECTION, EMULSION INTRAVENOUS at 07:49

## 2019-05-12 RX ADMIN — LIDOCAINE HYDROCHLORIDE 2 MG/MIN: 4 INJECTION, SOLUTION INTRAVENOUS at 08:46

## 2019-05-12 RX ADMIN — LATANOPROST 1 DROP: 50 SOLUTION OPHTHALMIC at 13:54

## 2019-05-12 RX ADMIN — DEXAMETHASONE SODIUM PHOSPHATE 10 MG: 4 INJECTION, SOLUTION INTRAMUSCULAR; INTRAVENOUS at 08:06

## 2019-05-12 RX ADMIN — PHENYLEPHRINE HYDROCHLORIDE 50 MCG/MIN: 10 INJECTION INTRAVENOUS at 08:16

## 2019-05-12 RX ADMIN — MORPHINE SULFATE 2 MG: 2 INJECTION, SOLUTION INTRAMUSCULAR; INTRAVENOUS at 04:15

## 2019-05-12 RX ADMIN — LIDOCAINE HYDROCHLORIDE 60 MG: 20 INJECTION, SOLUTION EPIDURAL; INFILTRATION; INTRACAUDAL; PERINEURAL at 08:08

## 2019-05-12 RX ADMIN — SODIUM CHLORIDE, POTASSIUM CHLORIDE, SODIUM LACTATE AND CALCIUM CHLORIDE: 600; 310; 30; 20 INJECTION, SOLUTION INTRAVENOUS at 07:43

## 2019-05-12 RX ADMIN — Medication 2 G: at 07:44

## 2019-05-12 RX ADMIN — KETAMINE HYDROCHLORIDE 10 MG: 10 INJECTION INTRAMUSCULAR; INTRAVENOUS at 08:09

## 2019-05-12 RX ADMIN — Medication 10 ML: at 04:17

## 2019-05-12 RX ADMIN — ONDANSETRON HYDROCHLORIDE 4 MG: 2 INJECTION, SOLUTION INTRAMUSCULAR; INTRAVENOUS at 08:06

## 2019-05-12 RX ADMIN — PROPOFOL 50 MCG/KG/MIN: 10 INJECTION, EMULSION INTRAVENOUS at 07:59

## 2019-05-12 RX ADMIN — PROPOFOL 30 MG: 10 INJECTION, EMULSION INTRAVENOUS at 07:52

## 2019-05-12 RX ADMIN — CEFAZOLIN SODIUM 1 G: 1 SOLUTION INTRAVENOUS at 15:32

## 2019-05-12 RX ADMIN — SODIUM CHLORIDE: 9 INJECTION, SOLUTION INTRAVENOUS at 06:08

## 2019-05-12 RX ADMIN — HYDROCODONE BITARTRATE AND ACETAMINOPHEN 1 TABLET: 5; 325 TABLET ORAL at 21:10

## 2019-05-12 ASSESSMENT — PULMONARY FUNCTION TESTS
PIF_VALUE: 1
PIF_VALUE: 0
PIF_VALUE: 1

## 2019-05-12 ASSESSMENT — PAIN - FUNCTIONAL ASSESSMENT
PAIN_FUNCTIONAL_ASSESSMENT: PREVENTS OR INTERFERES SOME ACTIVE ACTIVITIES AND ADLS
PAIN_FUNCTIONAL_ASSESSMENT: PREVENTS OR INTERFERES SOME ACTIVE ACTIVITIES AND ADLS
PAIN_FUNCTIONAL_ASSESSMENT: ACTIVITIES ARE NOT PREVENTED

## 2019-05-12 ASSESSMENT — PAIN DESCRIPTION - PAIN TYPE
TYPE: ACUTE PAIN
TYPE: ACUTE PAIN;SURGICAL PAIN
TYPE: SURGICAL PAIN
TYPE: SURGICAL PAIN

## 2019-05-12 ASSESSMENT — PAIN SCALES - GENERAL
PAINLEVEL_OUTOF10: 6
PAINLEVEL_OUTOF10: 8
PAINLEVEL_OUTOF10: 0

## 2019-05-12 ASSESSMENT — PAIN DESCRIPTION - PROGRESSION
CLINICAL_PROGRESSION: GRADUALLY WORSENING
CLINICAL_PROGRESSION: GRADUALLY WORSENING
CLINICAL_PROGRESSION: GRADUALLY IMPROVING
CLINICAL_PROGRESSION: GRADUALLY IMPROVING

## 2019-05-12 ASSESSMENT — PAIN DESCRIPTION - FREQUENCY
FREQUENCY: INTERMITTENT
FREQUENCY: INTERMITTENT

## 2019-05-12 ASSESSMENT — PAIN DESCRIPTION - LOCATION
LOCATION: HIP
LOCATION: HIP;LEG
LOCATION: HIP
LOCATION: HIP

## 2019-05-12 ASSESSMENT — PAIN DESCRIPTION - ORIENTATION
ORIENTATION: RIGHT

## 2019-05-12 ASSESSMENT — PAIN DESCRIPTION - DESCRIPTORS
DESCRIPTORS: DISCOMFORT;JABBING;SHARP
DESCRIPTORS: ACHING;DISCOMFORT;PRESSURE;SHARP

## 2019-05-12 ASSESSMENT — PAIN DESCRIPTION - ONSET
ONSET: ON-GOING
ONSET: ON-GOING

## 2019-05-12 NOTE — CARE COORDINATION
Social Work/Discharge Planning:  Social Work consult noted. Met with patient and her  Brenton Buchanan and completed initial assessment. Explained Social Work role and discussed transition of care/discharge planning. Patient lives with her  in a one story house. PTA patient independent with no adaptive device. Patient denies having any dme and no history with c or snf. Discussed [facility] score indicating need for snf and offered snf list.  Patient states she prefers [facility]. Informed patient that  will make referral to facility tomorrow. Will continue to follow and assist with discharge planning.   Electronically signed by CRISTA Gonzalez on 5/12/2019 at 3:42 PM

## 2019-05-12 NOTE — PROGRESS NOTES
Physical Therapy    Facility/Department: [facility] SURGERY  Initial Assessment    NAME: Joseluis Kumari  : 1940  MRN: [id number]    Date of Service: 2019    Discharge Recommendations:      PT Equipment Recommendations  Equipment Needed: Yes  Mobility Devices: [place]: Rolling    Assessment   Body structures, Functions, Activity limitations: Decreased functional mobility ; Decreased high-level IADLs;Decreased ADL status; Decreased ROM; Decreased strength;Decreased balance; Increased Pain  Assessment: Ambulatory dysfunction  Prognosis: Excellent  REQUIRES PT FOLLOW UP: Yes  Activity Tolerance  Activity Tolerance: Patient limited by pain       Patient Diagnosis(es): The encounter diagnosis was Closed displaced intertrochanteric fracture of right femur, initial encounter ([facility] [address] has a past medical history of Depression and Hypertension. has no past surgical history on file.     Restrictions  Restrictions/Precautions  Restrictions/Precautions: Weight Bearing, General Precautions, Fall Risk  Required Braces or Orthoses?: No  Lower Extremity Weight Bearing Restrictions  Right Lower Extremity Weight Bearing: Weight Bearing As Tolerated  Vision/Hearing  Vision: Within Functional Limits  Hearing: Within functional limits     Subjective  General  Chart Reviewed: Yes  Patient assessed for rehabilitation services?: Yes  Family / Caregiver Present: Yes  Follows Commands: Within Functional Limits  Pain Screening  Patient Currently in Pain: Yes  Pain Assessment  Pain Type: Acute pain;Surgical pain  Pain Location: Hip  Pain Orientation: Right  Vital Signs  Patient Currently in Pain: Yes       Orientation  Orientation  Overall Orientation Status: Within Normal Limits  Social/Functional History  Social/Functional History  Lives With: Significant other  Type of Home: House  Home Layout: One level  Home Access: Stairs to enter without rails  Entrance Stairs - Number of Steps: 1  Home Equipment: (No AD use PTA)  ADL Assistance: Independent  Homemaking Assistance: Independent  Ambulation Assistance: Independent  Transfer Assistance: Independent  Active : Yes  Mode of Transportation: Car  Occupation: Retired  Cognition        Objective          AROM RLE (degrees)  RLE General AROM: Rt hip AA/P flex 85-90*, Knee ext WNL, flex knee 95*/pain, ankle WNL  AROM LLE (degrees)  LLE AROM : WNL  Strength RLE  Comment: Hip 3-/5, knee 4/5, ankle 5/5  Strength LLE  Strength LLE: WNL        Bed mobility  Rolling to Left: Stand by assistance  Rolling to Right: Stand by assistance  Supine to Sit: Moderate assistance  Sit to Supine: Unable to assess  Scooting: Stand by assistance;Contact guard assistance  Transfers  Sit to Stand: Contact guard assistance;Stand by assistance  Stand to sit: Stand by assistance;Supervision  Ambulation  Ambulation?: Yes  WB Status: WBAT RTLE  Ambulation 1  Surface: level tile  Device: Rolling Walker  Assistance: Contact guard assistance  Quality of Gait: Able to load and advance indep, no real C/O pain, narrow AMY  Distance: 5' to chair  Stairs/Curb  Stairs?: No              Plan   Plan  Times per week: daily BID  Current Treatment Recommendations: Strengthening, Gait Training, Manual Therapy - Joint Manipulation, Patient/Caregiver Education & Training, ROM, Stair training, Equipment Evaluation, Education, & procurement, Balance Training, Neuromuscular Re-education, Functional Mobility Training, Endurance Training, Transfer Training, Safety Education & Training  Plan Comment: [employer] 16/24    [employer]       OutComes Score                                                  AM-PAC Score             Goals  Short term goals  Time Frame for Short term goals: 1 Week  Short term goal 1: Bed mobility will imp to S/Indep  Short term goal 2: Transfers will imp to S/indep  Short term goal 3: ROM RT hip/knee will Imp 10-20*  Short term goal 4: MMT RTLE will imp 1 grade   Short term goal 5: Amb with [employer] S/Indep 100+' with steps x 2-3 S/SBA  Patient Goals   Patient goals :  To return home/PLOF       Therapy Time   Individual Concurrent Group Co-treatment   Time In           Time Out           Minutes                   Radha Lipscomb, PT

## 2019-05-12 NOTE — PLAN OF CARE
Problem: Falls - Risk of:  Goal: Will remain free from falls  Description  Will remain free from falls  Outcome: Met This Shift  Goal: Absence of physical injury  Description  Absence of physical injury  Outcome: Met This Shift     Problem: Pain:  Goal: Pain level will decrease  Description  Pain level will decrease  Outcome: Met This Shift  Goal: Control of acute pain  Description  Control of acute pain  Outcome: Met This Shift  Goal: Control of chronic pain  Description  Control of chronic pain  Outcome: Met This Shift     Problem: Skin Integrity:  Goal: Will show no infection signs and symptoms  Description  Will show no infection signs and symptoms  Outcome: Met This Shift  Goal: Absence of new skin breakdown  Description  Absence of new skin breakdown  Outcome: Met This Shift     Problem: Anxiety:  Goal: Level of anxiety will decrease  Description  Level of anxiety will decrease  Outcome: Met This Shift

## 2019-05-12 NOTE — PLAN OF CARE
Problem: Falls - Risk of:  Goal: Will remain free from falls  Description  Will remain free from falls  5/12/2019 1035 by Marcelle Tobin RN  Outcome: Met This Shift  5/12/2019 0012 by Annmarie Page RN  Outcome: Met This Shift  Goal: Absence of physical injury  Description  Absence of physical injury  5/12/2019 1035 by Marcelle Tobin RN  Outcome: Met This Shift  5/12/2019 0012 by Annmarie Page RN  Outcome: Met This Shift     Problem: Pain:  Goal: Pain level will decrease  Description  Pain level will decrease  5/12/2019 1035 by Marcelle Tobin RN  Outcome: Met This Shift  5/12/2019 0012 by Annmarie Page RN  Outcome: Met This Shift  Goal: Control of acute pain  Description  Control of acute pain  5/12/2019 1035 by Marcelle Tobin RN  Outcome: Met This Shift  5/12/2019 0012 by Annmarie Page RN  Outcome: Met This Shift  Goal: Control of chronic pain  Description  Control of chronic pain  5/12/2019 0012 by Annmarie Page RN  Outcome: Met This Shift     Problem: Skin Integrity:  Goal: Will show no infection signs and symptoms  Description  Will show no infection signs and symptoms  5/12/2019 1035 by Marcelle Tobin RN  Outcome: Met This Shift  5/12/2019 0012 by Annmarie Page RN  Outcome: Met This Shift  Goal: Absence of new skin breakdown  Description  Absence of new skin breakdown  5/12/2019 1035 by Marcelle Tobin RN  Outcome: Met This Shift  5/12/2019 0012 by Annmarie Page RN  Outcome: Met This Shift     Problem: Anxiety:  Goal: Level of anxiety will decrease  Description  Level of anxiety will decrease  5/12/2019 1035 by Marcelle Tobin RN  Outcome: Met This Shift  5/12/2019 0012 by Annmarie Page RN  Outcome: Met This Shift     Problem: Risk for Impaired Skin Integrity  Goal: Tissue integrity - skin and mucous membranes  Description  Structural intactness and normal physiological function of skin and  mucous membranes.   Outcome: Met This Shift

## 2019-05-12 NOTE — OP NOTE
[address] [address]                                OPERATIVE REPORT    PATIENT NAME: Tami Matias                      :        1940  MED REC NO:   [id number]                            ROOM:       3895  ACCOUNT NO:   [de-identified]                           ADMIT DATE: 2019  PROVIDER:     Shital Leija MD    DATE OF PROCEDURE:  2019    PREOPERATIVE DIAGNOSIS:  Left four-part intertrochanteric hip fracture,  displaced. POSTOPERATIVE DIAGNOSIS:  Left four-part intertrochanteric hip fracture,  displaced. PROCEDURE PERFORMED:  IM nail of right intertrochanteric hip fracture. SURGEON:  Shital Leija M.D. ANESTHESIA:  Spinal and sedation with local.    COMPLICATIONS:  None. ESTIMATED BLOOD LOSS:  100 mL. HARDWARE:  Diagnostic Hybridser gamma 3, 11 mm x 360 mm length nail with a  95-mm lag screw and a 45-mm distal locking screw. SPECIMENS:  None. COMPLICATIONS:  None. DETAILS OF PROCEDURE:  The patient was identified and brought to the  operating room. The patient, after spinal, was placed on the fracture  table. Her well leg was flexed, abducted, and secured into a padded leg  well. Her operative leg was placed in gentle in-line traction and  visualized in various projections. It was noticed that the _____  transtrochanteric component as well as a comminuted greater trochanter  and the lesser was off as well as the intertrochanteric head and neck  component. On the lateral, the head and neck were anterior to the shaft  and did not reduce with elevation. When this was performed, it just  further elevated the neck because the fracture was engaged. At this  point, the right leg was prepped and draped in a sterile fashion. Standard stab incision was made proximal to the greater trochanter and a  guidewire was placed into its tip.   It was advanced into the proximal  femur, confirmed under fluoroscopy, and a one-step drill bit and guide  were used to open up the proximal femur. Guidewire was passed, it was  measured. The canal was tight and it was sequentially reamed to 12.5 to  get the 11-mm nail down. At this point, the nail in the handle and the  insertion handle were used a reduction tool to disengage the  intertrochanteric region from the neck to elevate it up and then  compress it together manually to the handle. A second incision was made  and a guidewire was placed in the central aspect of the femoral head and  neck and this lined up the intertrochanteric area, so that it was  anatomic. Rotation was set. Lag screw was measured, reamed, and filled  appropriately. Traction was released. It was compressed together and  derotational set screw was placed appropriately showing anatomic  alignment of the proximal femur. The two incisions proximally were  copiously irrigated and closed in layers with staples in the skin. The  leg was abducted and a freehand distal locking screw was placed without  any difficulty. Hardcopy fluoroscopic pictures were saved in the AP and  lateral projection of the proximal aspect and distal aspect of the nail  showing placement. POSTOPERATIVE PLAN:  She will weightbear as tolerated on her right lower  extremity. We will use a baby aspirin for DVT prophylaxis. She will be  discharged on half to one Norco 5/325 tablet q.6 hours p.r.n. for pain,  number 28. She will follow up in the office in two weeks' time with  x-rays of her right femur.         Fany Quintanilla MD    D: 05/12/2019 9:27:58       T: 05/12/2019 9:29:49     [email or web address]  Job#: [id number]     Doc#: [id number]    CC:  Adria Calderon DO

## 2019-05-12 NOTE — ANESTHESIA PROCEDURE NOTES
Spinal Block    Patient location during procedure: OR  Reason for block: primary anesthetic and at surgeon's request  Staffing  Anesthesiologist: Lanette Hilario MD  Resident/CRNA: NALINI Burris - CRNA  Performed: resident/CRNA   Preanesthetic Checklist  Completed: patient identified, site marked, surgical consent, pre-op evaluation, timeout performed, IV checked, risks and benefits discussed, monitors and equipment checked, anesthesia consent given, oxygen available and patient being monitored  Spinal Block  Patient position: sitting  Prep: ChloraPrep  Patient monitoring: cardiac monitor, continuous pulse ox, continuous capnometry and frequent blood pressure checks  Approach: midline  Location: L3/L4  Provider prep: mask, sterile gloves and sterile gown  Local infiltration: lidocaine  Agent: bupivacaine  Dose: 2  Dose: 2  Needle  Needle type: Quincke   Needle gauge: 22 G  Needle length: 3.5 in  Assessment  Sensory level: T6  Swirl obtained: Yes  CSF: clear  Attempts: 1  Hemodynamics: stable

## 2019-05-12 NOTE — ANESTHESIA POSTPROCEDURE EVALUATION
Department of Anesthesiology  Postprocedure Note    Patient: Mitch Valencia  MRN: [id number]  YOB: 1940  Date of evaluation: 5/12/2019  Time:  11:34 AM     Procedure Summary     Date:  05/12/19 Room / Location:  [facility] OR 01 / [facility] OR    Anesthesia Start:  0743 Anesthesia Stop:  0927    Procedure:  HIP OPEN REDUCTION INTERNAL FIXATION (Right ) Diagnosis:  (right hip fracture)    Surgeon:  Felicitas Kanner, MD Responsible Provider:  Killian Horan MD    Anesthesia Type:  general, spinal ASA Status:  2          Anesthesia Type: general, spinal    Mallorie Phase I: Mallorie Score: 10    Mallorie Phase II:      Last vitals: Reviewed and per EMR flowsheets.        Anesthesia Post Evaluation    Patient location during evaluation: PACU  Patient participation: complete - patient participated  Level of consciousness: awake and alert  Airway patency: patent  Nausea & Vomiting: no nausea and no vomiting  Complications: no  Cardiovascular status: hemodynamically stable  Respiratory status: acceptable  Hydration status: euvolemic

## 2019-05-12 NOTE — CONSULTS
[address] [address]                                  CONSULTATION    PATIENT NAME: Tory Saha                      :        1940  MED REC NO:   [id number]                            ROOM:       4700  ACCOUNT NO:   [de-identified]                           ADMIT DATE: 2019  PROVIDER:     Marcela Jackson MD    CONSULT DATE:  2019    REQUESTING PHYSICIAN:  Johnie Henry M.D.    CHIEF COMPLAINT:  Right hip. HISTORY OF PRESENT ILLNESS:  This is an 55-year-old community ambulator  without any assistive devices, who was getting out of a car in a parking  lot when her leg just gave out and she fell. She has not had any prior  problems with her leg and is a relatively active person. She is working  up to about six months ago, a full-time job, where she stood on her  feet. Currently, she states that she can go up and down steps one after  the other and use a handrail. She can get her shoes and socks on  without any difficulty and she can get up from a kneeling position. She  denies any pain anywhere else. PHYSICAL EXAMINATION:  She is awake and alert, in no apparent distress. She has a full active range of motion of both wrists, elbows, and  shoulders. She can lift both arms above shoulder level. She denies any  numbness or tingling in her fingers or locking of any digit. She has a  2+ radial pulse bilaterally. She has no pain with palpating her  clavicles. There is a brisk capillary refill in her fingers. Both  lower extremities, the anterior tibialis, posterior tibialis, and  peroneal motor function are intact. He has no pain with palpation of  her medial or lateral malleoli, medial or lateral tibial plateau, or  medial or lateral femoral condyles bilaterally. There is no effusion in  either knee. Her right leg is shortened and externally rotated.   There  is no evidence of skin breakdown from what I could see above the level  the bed line. She has no pain with logrolling her right leg. There is  2+ dorsalis pedis pulse on both feet. She denies any numbness or  tingling to light touch throughout her feet. DIAGNOSTIC STUDIES:  Radiographs right hip shows a comminuted  transtrochanteric fracture. ASSESSMENT:  Right intertrochanteric hip fracture. PLAN:  The risks and benefits have been discussed with the patient. I  will fix her leg with a longer nail given this transverse pattern. We  would see if we need to fix distally. She will be weightbearing as  tolerated postoperatively and she will resume her baby aspirin daily  starting tomorrow for the purpose of DVT prophylaxis. She will work  with therapy and she will determine her course whether or not she goes  home or if she goes to a facility. Please note that least 55 minutes was spent between discussion with  staff in the emergency room, evaluation of the patient, discussion with  the patient and her family, documentation, setting up surgery, and  reviewing her labs which I have also done.         Karina Duggan MD    D: 05/12/2019 7:58:47       T: 05/12/2019 8:00:35     [email or web address]  Job#: [id number]     Doc#: [id number]    CC:  Guevara Shanks DO

## 2019-05-12 NOTE — PROGRESS NOTES
[facility]    Subjective:  Pt seen postop day # 0 pt alert conversive pain controlled family member at bedside      Current Facility-Administered Medications:     lidocaine (CARDIAC INFUSION) 2 g in dextrose 5% 500 mL infusion, 2 mg/min, Intravenous, Continuous, Cong Brewer MD, Stopped at 05/12/19 0904    ceFAZolin (ANCEF) 1 g in dextrose 4 % 50 mL IVPB (duplex, 1 g, Intravenous, Q8H, Cong Brewer MD    docusate sodium (COLACE) capsule 100 mg, 100 mg, Oral, Daily, Cong Brewer MD  Kassandra Mehta  [START ON 5/13/2019] aspirin EC tablet 81 mg, 81 mg, Oral, Daily, Cong Brewer MD    latanoprost (XALATAN) 0.005 % ophthalmic solution 1 drop, 1 drop, Both Eyes, Daily, Javi Shields,     amLODIPine (NORVASC) tablet 5 mg, 5 mg, Oral, Daily, Cong Brewer MD, 5 mg at 05/11/19 1306    buPROPion (WELLBUTRIN XL) extended release tablet 150 mg, 150 mg, Oral, QAM, Cong Brewer MD, 150 mg at 05/11/19 1306    sodium chloride flush 0.9 % injection 10 mL, 10 mL, Intravenous, 2 times per day, Cong Brewer MD    sodium chloride flush 0.9 % injection 10 mL, 10 mL, Intravenous, PRN, Cong Brewer MD, 10 mL at 05/12/19 0417    magnesium hydroxide (MILK OF MAGNESIA) 400 MG/5ML suspension 30 mL, 30 mL, Oral, Daily PRN, Cong Brewer MD    ondansetron [facility]) injection 4 mg, 4 mg, Intravenous, Q6H PRN, Cong Brewer MD    0.9 % sodium chloride infusion, , Intravenous, Continuous, Cong Brewer MD, Last Rate: 75 mL/hr at 05/12/19 0608    acetaminophen (TYLENOL) tablet 650 mg, 650 mg, Oral, Q4H PRN, Cong Brewer MD    HYDROcodone-acetaminophen [facility]) 5-325 MG per tablet 1 tablet, 1 tablet, Oral, Q4H PRN, Cong Brewer MD, 1 tablet at 05/11/19 2033    lisinopril (PRINIVIL;ZESTRIL) tablet 10 mg, 10 mg, Oral, Daily, Cong Brewer MD, 10 mg at 05/11/19 1306    HYDROcodone-acetaminophen (NORCO) 5-325 MG per tablet 0.5 tablet, 0.5 tablet, Oral, Q4H PRN, Cong Brewer MD    morphine (PF) injection 2 mg, 2 mg, Intravenous, Q3H PRN, Joe Martinez MD Brenda, 2 mg at 05/12/19 0415    morphine (PF) injection 1 mg, 1 mg, Intravenous, Q3H PRN, Fabiola Childress MD    Objective:    BP (!) 102/58   Pulse 108   Temp 97.5 °F (36.4 °C) (Oral)   Resp 16   Ht 5' 2\" (1.575 m)   Wt 130 lb (59 kg)   SpO2 93%   BMI 23.78 kg/m²     Heart:  Reg  Lungs:  CTA bilaterally, no wheeze, rales or rhonchi  Abd: bowel sounds present, nontender, nondistended, no masses  Extrem:  No clubbing, cyanosis, or edema    CBC with Differential:    Lab Results   Component Value Date    WBC 7.6 05/11/2019    RBC 4.48 05/11/2019    HGB 15.3 05/11/2019    HCT 46.1 05/11/2019     05/11/2019    .9 05/11/2019    MCH 34.2 05/11/2019    MCHC 33.2 05/11/2019    RDW 13.0 05/11/2019    LYMPHOPCT 24.0 05/11/2019    MONOPCT 8.6 05/11/2019    BASOPCT 0.5 05/11/2019    MONOSABS 0.65 05/11/2019    LYMPHSABS 1.82 05/11/2019    EOSABS 0.08 05/11/2019    BASOSABS 0.04 05/11/2019     CMP:    Lab Results   Component Value Date     05/11/2019    K 5.0 05/11/2019     05/11/2019    CO2 25 05/11/2019    BUN 21 05/11/2019    CREATININE 0.6 05/11/2019    GFRAA >60 05/11/2019    LABGLOM >60 05/11/2019    GLUCOSE 109 05/11/2019    CALCIUM 9.1 05/11/2019     Warfarin PT/INR:    Lab Results   Component Value Date    INR 0.9 05/11/2019    PROTIME 10.3 05/11/2019       Assessment:    Active Problems:    Intertrochanteric fracture of right femur, closed, initial encounter ([facility] [address]    Hypertension  Resolved Problems:    * No resolved hospital problems.  *      Plan:  Cont postop care as per ortho pain control serial lab        Luellen Gram  11:29 AM  5/12/2019

## 2019-05-13 LAB
ANION GAP SERPL CALCULATED.3IONS-SCNC: 11 MMOL/L (ref 7–16)
BUN BLDV-MCNC: 16 MG/DL (ref 8–23)
CALCIUM SERPL-MCNC: 8.5 MG/DL (ref 8.6–10.2)
CHLORIDE BLD-SCNC: 99 MMOL/L (ref 98–107)
CO2: 25 MMOL/L (ref 22–29)
CREAT SERPL-MCNC: 0.5 MG/DL (ref 0.5–1)
GFR AFRICAN AMERICAN: >60
GFR NON-AFRICAN AMERICAN: >60 ML/MIN/1.73
GLUCOSE BLD-MCNC: 154 MG/DL (ref 74–99)
HCT VFR BLD CALC: 34.6 % (ref 34–48)
HEMOGLOBIN: 11.4 G/DL (ref 11.5–15.5)
MCH RBC QN AUTO: 33.3 PG (ref 26–35)
MCHC RBC AUTO-ENTMCNC: 32.9 % (ref 32–34.5)
MCV RBC AUTO: 101.2 FL (ref 80–99.9)
MRSA CULTURE ONLY: NORMAL
PDW BLD-RTO: 12.6 FL (ref 11.5–15)
PLATELET # BLD: 277 E9/L (ref 130–450)
PMV BLD AUTO: 10.4 FL (ref 7–12)
POTASSIUM SERPL-SCNC: 4.6 MMOL/L (ref 3.5–5)
RBC # BLD: 3.42 E12/L (ref 3.5–5.5)
SODIUM BLD-SCNC: 135 MMOL/L (ref 132–146)
WBC # BLD: 11.5 E9/L (ref 4.5–11.5)

## 2019-05-13 PROCEDURE — 2580000003 HC RX 258: Performed by: ORTHOPAEDIC SURGERY

## 2019-05-13 PROCEDURE — 80048 BASIC METABOLIC PNL TOTAL CA: CPT

## 2019-05-13 PROCEDURE — 97165 OT EVAL LOW COMPLEX 30 MIN: CPT

## 2019-05-13 PROCEDURE — 97110 THERAPEUTIC EXERCISES: CPT

## 2019-05-13 PROCEDURE — 6370000000 HC RX 637 (ALT 250 FOR IP): Performed by: INTERNAL MEDICINE

## 2019-05-13 PROCEDURE — 6370000000 HC RX 637 (ALT 250 FOR IP): Performed by: ORTHOPAEDIC SURGERY

## 2019-05-13 PROCEDURE — 6360000002 HC RX W HCPCS: Performed by: ORTHOPAEDIC SURGERY

## 2019-05-13 PROCEDURE — 36415 COLL VENOUS BLD VENIPUNCTURE: CPT

## 2019-05-13 PROCEDURE — 97535 SELF CARE MNGMENT TRAINING: CPT

## 2019-05-13 PROCEDURE — 1200000000 HC SEMI PRIVATE

## 2019-05-13 PROCEDURE — 97530 THERAPEUTIC ACTIVITIES: CPT

## 2019-05-13 PROCEDURE — 85027 COMPLETE CBC AUTOMATED: CPT

## 2019-05-13 RX ADMIN — CEFAZOLIN SODIUM 1 G: 1 SOLUTION INTRAVENOUS at 00:34

## 2019-05-13 RX ADMIN — LATANOPROST 1 DROP: 50 SOLUTION OPHTHALMIC at 08:59

## 2019-05-13 RX ADMIN — HYDROCODONE BITARTRATE AND ACETAMINOPHEN 1 TABLET: 5; 325 TABLET ORAL at 22:54

## 2019-05-13 RX ADMIN — Medication 10 ML: at 20:27

## 2019-05-13 RX ADMIN — AMLODIPINE BESYLATE 5 MG: 5 TABLET ORAL at 08:59

## 2019-05-13 RX ADMIN — DOCUSATE SODIUM 100 MG: 100 CAPSULE, LIQUID FILLED ORAL at 08:59

## 2019-05-13 RX ADMIN — HYDROCODONE BITARTRATE AND ACETAMINOPHEN 1 TABLET: 5; 325 TABLET ORAL at 18:43

## 2019-05-13 RX ADMIN — ASPIRIN 81 MG: 81 TABLET, COATED ORAL at 08:59

## 2019-05-13 RX ADMIN — SODIUM CHLORIDE: 9 INJECTION, SOLUTION INTRAVENOUS at 00:34

## 2019-05-13 RX ADMIN — HYDROCODONE BITARTRATE AND ACETAMINOPHEN 1 TABLET: 5; 325 TABLET ORAL at 08:59

## 2019-05-13 RX ADMIN — HYDROCODONE BITARTRATE AND ACETAMINOPHEN 1 TABLET: 5; 325 TABLET ORAL at 04:12

## 2019-05-13 RX ADMIN — BUPROPION HYDROCHLORIDE 150 MG: 150 TABLET, FILM COATED, EXTENDED RELEASE ORAL at 08:59

## 2019-05-13 ASSESSMENT — PAIN DESCRIPTION - DESCRIPTORS
DESCRIPTORS: ACHING;DISCOMFORT
DESCRIPTORS: ACHING;DISCOMFORT
DESCRIPTORS: ACHING;DISCOMFORT;TENDER
DESCRIPTORS: ACHING;DISCOMFORT
DESCRIPTORS: DISCOMFORT;SORE;PRESSURE

## 2019-05-13 ASSESSMENT — PAIN - FUNCTIONAL ASSESSMENT
PAIN_FUNCTIONAL_ASSESSMENT: ACTIVITIES ARE NOT PREVENTED
PAIN_FUNCTIONAL_ASSESSMENT: PREVENTS OR INTERFERES SOME ACTIVE ACTIVITIES AND ADLS
PAIN_FUNCTIONAL_ASSESSMENT: ACTIVITIES ARE NOT PREVENTED
PAIN_FUNCTIONAL_ASSESSMENT: PREVENTS OR INTERFERES SOME ACTIVE ACTIVITIES AND ADLS

## 2019-05-13 ASSESSMENT — PAIN DESCRIPTION - FREQUENCY
FREQUENCY: CONTINUOUS
FREQUENCY: INTERMITTENT
FREQUENCY: INTERMITTENT

## 2019-05-13 ASSESSMENT — PAIN DESCRIPTION - ORIENTATION
ORIENTATION: RIGHT

## 2019-05-13 ASSESSMENT — PAIN DESCRIPTION - PROGRESSION
CLINICAL_PROGRESSION: GRADUALLY WORSENING
CLINICAL_PROGRESSION: GRADUALLY WORSENING
CLINICAL_PROGRESSION: GRADUALLY IMPROVING

## 2019-05-13 ASSESSMENT — PAIN SCALES - GENERAL
PAINLEVEL_OUTOF10: 4
PAINLEVEL_OUTOF10: 0
PAINLEVEL_OUTOF10: 6
PAINLEVEL_OUTOF10: 0
PAINLEVEL_OUTOF10: 2
PAINLEVEL_OUTOF10: 0

## 2019-05-13 ASSESSMENT — PAIN DESCRIPTION - ONSET
ONSET: ON-GOING

## 2019-05-13 ASSESSMENT — PAIN DESCRIPTION - LOCATION
LOCATION: HIP

## 2019-05-13 ASSESSMENT — PAIN DESCRIPTION - PAIN TYPE
TYPE: SURGICAL PAIN

## 2019-05-13 ASSESSMENT — PAIN SCALES - WONG BAKER: WONGBAKER_NUMERICALRESPONSE: 0

## 2019-05-13 NOTE — PROGRESS NOTES
Occupational Therapy  OCCUPATIONAL THERAPY INITIAL EVALUATION      Date:2019  Patient Name: Mitch Valencia  MRN: [id number]  : 1940  Room: [address] [place], [place] [address]    Evaluating OT: Ibis Marie OTR/L [id number]    AM-PAC Daily Activity Raw Score:     Recommended Adaptive Equipment: TBD    Diagnosis: R femur fracture. Surgery: 19 IM nailing R intertrochanteric hip fracture   Pertinent Medical History: HTN   Precautions:  Falls, WBAT R LE     Home Living: Pt lives with  in a single story home with 1 step no HR on entry. Bathroom setup: tub/shower combo with grab bars     Prior Level of Function: Independent with ADLs, Independent with IADLs; completed functional mobility no AD. Pt reports  has a [facility] she can use at home  Driving: Yes    Pain Level: R LE pain with weight bearing    Cognition: A&O: 4/4. Pt is alert and oriented however mild confusion throughout session   Problem solving:  fair   Judgement/safety:  fair     Functional Assessment:   Initial Eval Status  Date: 19 Treatment session:  Short Term Goals  Treatment frequency: 2-5x/wk PRN x1-3 wks   Feeding Independent     Grooming Set up     UB Dressing Set up     LB Dressing Max A  Donning B socks seated in armchair  Min A    Bathing Mod A  Min A   Toileting Mod A  Min A   Bed Mobility  Supine to sit: NT seated in chair on entry     Functional Transfers STS: Min A  SBA   Functional Mobility Min A with [facility]  Short in room distance  SBA during ADLs   Balance Sitting: fair    Standing: fair/fair minus at [facility]     Activity Tolerance Poor plus. Limited by pain in R LE with stepping  standing ynes x6-7 min with fair plus balance during self care tasks           ADL comments: Pt is limited in completion of self care tasks due to weakness, fatigue, pain in R LE and decreased ROM in RLE.       Strength ROM Additional Info:    RUE  3+/5 WFL good  and FMC/dexterity noted during ADL tasks     LUE 3+/5 WFL good  and FMC/dexterity noted during ADL tasks         Hearing: WFL   Vision: WFL   Sensation:  No c/o numbness or tingling   Tone: WFL   Edema: none                             Comments/Treatment: Patient educated on adapted techniques for completion of ADL, safe functional transfers and mobility. Patient required cues for follow through with proper hand/foot placement, pacing, safety, precautions and technique in functional transfers, functional mobility, LB dressing and toileting in preparation for maximum independence in all self care tasks. Eval Complexity: Low    Assessment of current deficits   Functional mobility [x]  ADLs [x] Strength [x]  Cognition []  Functional transfers  [x] IADLs [x] Safety Awareness [x]  Endurance [x]  Fine Motor Coordination [] Balance [x] Vision/perception [] Sensation []   Gross Motor Coordination [] ROM [] Delirium []                  Motor Control []    Plan of Care:   ADL retraining [x]   Equipment needs [x]   Neuromuscular re-education [x] Energy Conservation Techniques [x]  Functional Transfer training [x] Patient and/or Family Education [x]  Functional Mobility training [x]  Environmental Modifications [x]  Cognitive re-training []   Compensatory techniques for ADLs [x]  Splinting Needs []   Positioning to improve overall function [x]   Therapeutic Activity [x]              Therapeutic Exercise  [x]  Visual/Perceptual: []    Delirium prevention/treatment  []   Other:  []    Rehab Potential: Good for established goals     Patient / Family Goal: To get to rehab. Patient and/or family were instructed on functional diagnosis, prognosis/goals and OT plan of care. Pt verbalized good understanding. Upon arrival, patient seated in chair. At end of session, patient seated in chair with call light and phone within reach, all lines and tubes intact.   Pt would benefit from continued skilled OT to increase safety and independence with completion of ADL/IADL tasks for functional independence and quality of life.      Low Evaluation + 10 timed treatment minutes  Tx Time in: 845  Tx Time out: 855    Evaluation time includes thorough review of current medical information, gathering information on past medical history/social history and prior level of function, completion of standardized testing/informal observation of tasks, assessment of data, and development of POC/Goals    Penny Tinsley OTR/L  [id number]

## 2019-05-13 NOTE — CARE COORDINATION
Social Work:    Chart reviewed and social service was updated about case this a.m. by Cara Fonseca, who covered care on the weekend. Plan is [facility] as patient requested by patient & family Sunday. Social work called Zaida Morse in admissions at [facility] and faxed all medical today. Zaida Morse confirmed acceptance into their skilled rehab for Tuesday. Social work updated patient & family.   ([phone] fax [phone])    Electronically signed by CRISTA Ingram on 5/13/2019 at 3:20 PM

## 2019-05-13 NOTE — PROGRESS NOTES
Subjective:    Awake and alert. No problems overnight. Denies chest pain or dyspnea. Denies abdominal pain. Tolerating diet. No nausea or vomiting. Objective:    BP (!) 90/58   Pulse 71   Temp 98 °F (36.7 °C)   Resp 18   Ht 5' 2\" (1.575 m)   Wt 130 lb (59 kg)   SpO2 95%   BMI 23.78 kg/m²   Skin: Warm and dry  Neck: Supple, no JVD  Heart:  RRR, no murmurs, gallops, or rubs. Lungs:  CTA bilaterally, no wheeze, rales or rhonchi  Abd: bowel sounds present, nontender, nondistended, no masses  Extrem:  No clubbing, cyanosis, or edema, pulses intact    I/O last 3 completed shifts: In: 2100 [P.O.:800; I.V.:1300]  Out: 2300 [Urine:2200; Blood:100]    Laboratory:     CBC with Differential:    Lab Results   Component Value Date    WBC 11.5 05/13/2019    RBC 3.42 05/13/2019    HGB 11.4 05/13/2019    HCT 34.6 05/13/2019     05/13/2019    .2 05/13/2019    MCH 33.3 05/13/2019    MCHC 32.9 05/13/2019    RDW 12.6 05/13/2019    LYMPHOPCT 24.0 05/11/2019    MONOPCT 8.6 05/11/2019    BASOPCT 0.5 05/11/2019    MONOSABS 0.65 05/11/2019    LYMPHSABS 1.82 05/11/2019    EOSABS 0.08 05/11/2019    BASOSABS 0.04 05/11/2019     CMP:    Lab Results   Component Value Date     05/13/2019    K 4.6 05/13/2019    K 5.0 05/11/2019    CL 99 05/13/2019    CO2 25 05/13/2019    BUN 16 05/13/2019    CREATININE 0.5 05/13/2019    GFRAA >60 05/13/2019    LABGLOM >60 05/13/2019    GLUCOSE 154 05/13/2019    CALCIUM 8.5 05/13/2019        XR FEMUR RIGHT (MIN 2 VIEWS)   Final Result   Postoperative right femur status post hardware placement   traverse the previously identified proximal right femoral fracture,   see above for details. FLUORO FOR SURGICAL PROCEDURES   Final Result   Intraoperative fluoroscopic imaging as described above. Please refer   to the operative report for further details. XR HIP 2-3 VW W PELVIS RIGHT   Final Result   ALERT:  THIS IS AN ABNORMAL REPORT    1.  Comminuted intertrochanteric fracture of the right proximal femur   with shearing of the lesser trochanter and with superior and lateral   distal fracture fragment migration         XR CHEST 1 VW   Final Result   Right hemidiaphragmatic elevation without otherwise   radiographic evidence of acute cardiopulmonary disease. Vascular   calcifications thoracic aorta. Prior to Admission medications    Medication Sig Start Date End Date Taking? Authorizing Provider   HYDROcodone-acetaminophen (NORCO) 5-325 MG per tablet Take 0.5-1 tablets by mouth every 6 hours as needed for Pain for up to 7 days.  5/12/19 5/19/19 Yes Jesse Marrero MD   aspirin (ASPIR-LOW) 81 MG EC tablet Take 1 tablet by mouth daily 5/12/19  Yes Jesse Marrero MD   latanoprost (XALATAN) 0.005 % ophthalmic solution Place 1 drop into both eyes daily   Yes Historical Provider, MD   benazepril (LOTENSIN) 10 MG tablet Take 10 mg by mouth daily   Yes Historical Provider, MD   amLODIPine (NORVASC) 5 MG tablet Take 5 mg by mouth daily   Yes Historical Provider, MD   buPROPion (WELLBUTRIN XL) 150 MG extended release tablet Take 150 mg by mouth every morning   Yes Historical Provider, MD        Current Facility-Administered Medications   Medication Dose Route Frequency Provider Last Rate Last Dose    docusate sodium (COLACE) capsule 100 mg  100 mg Oral Daily Jesse Marrero MD   100 mg at 05/13/19 0859    aspirin EC tablet 81 mg  81 mg Oral Daily Jesse Marrero MD   81 mg at 05/13/19 0859    latanoprost (XALATAN) 0.005 % ophthalmic solution 1 drop  1 drop Both Eyes Daily Luiz Shields, DO   1 drop at 05/13/19 0859    buPROPion (WELLBUTRIN XL) extended release tablet 150 mg  150 mg Oral QAM Jesse Marrero MD   150 mg at 05/13/19 0859    sodium chloride flush 0.9 % injection 10 mL  10 mL Intravenous 2 times per day Jesse Marrero MD        sodium chloride flush 0.9 % injection 10 mL  10 mL Intravenous PRN Jesse Marrero MD   10 mL at 05/12/19 0417    magnesium hydroxide

## 2019-05-13 NOTE — CARE COORDINATION
Met w/ patient. Explained role of . Lives w/  in 1 story house- no steps. PT AM-PAC score 16. Discharge plan is to [facility]- awaiting acceptance. Referral made 5/13 per SW. PCP is Dr. Alyson Avalos and pharmacy is [facility].  Will continue to follow Vannessa Hammond

## 2019-05-13 NOTE — PROGRESS NOTES
Physical Therapy    Facility/Department: [address] ORTHO SURGERY  Treatment note    NAME: Jamison Granado  : 1940  MRN: [id number]    Date of Service: 2019    Patient Diagnosis(es): The encounter diagnosis was Closed displaced intertrochanteric fracture of right femur, initial encounter ([facility] [address] has a past medical history of Depression and Hypertension. has no past surgical history on file. Room #: 635  DIAGNOSIS: R femur fracture, S/P IM Nailing  PRECAUTIONS: falls, safety, WBAT R LE  AMPA Score: 16/24    Pt is agreeable to treatment. Pain level is: mild pain during exercise R LE, increased pain during gait  Balance: fair minus/poor dynamic using WW for support     Functional Mobility  Bed Mobility  Rolling: NT  Supine to sit: NT  Sit to supine: NT  Scooting: NT     Transfers  Sit to stand: CGA  Stand to sit: Min A  Stand pivot: NT  Instruction/prompt UE usage to assist with transfers     Locomotion  Ambulation: 80 feet x 1 reps WBAT R LE with Min/CGA for balance using [employer] device. Raquel slow, guarded, laboring and inconsistent, step to pattern used, instruction given for sequence and B UE WB during L LE advancement    Pt performed therapeutic exercise of the following in a hip chair: seated B ankle pumps, quad/glut sets AROM; R LE heel slide motions, hip ABd and LAQ's AAROM x 20. Pt educated on exercise for circulation, ROM and strengtheing    Pt found in and remained in a bedside hip chair, call light in reach. Pt is making good progress towards physical therapy goals as per increased functional mobility performed and exercise participation.   Continue with physical therapy    Treatment time: 28 minutes  Time out: [address] PTA [address]

## 2019-05-13 NOTE — PROGRESS NOTES
[facility] Quality Flow/Interdisciplinary Rounds Progress Note        Quality Flow Rounds held on May 13, 2019    Disciplines Attending:  Bedside Nurse, ,  and Nursing Unit Leadership    Charles Jones was admitted on 5/11/2019  8:34 AM    Anticipated Discharge Date:  Expected Discharge Date: 05/14/19    Disposition:    Agustin Score:  Agustin Scale Score: 22    Readmission Risk              Risk of Unplanned Readmission:        11           Discussed patient goal for the day, patient clinical progression, and barriers to discharge.   The following Goal(s) of the Day/Commitment(s) have been identified:  Discharge [address] [place]  May 13, 2019

## 2019-05-13 NOTE — PLAN OF CARE
Problem: Falls - Risk of:  Goal: Will remain free from falls  Description  Will remain free from falls  5/12/2019 2226 by Abbe Hoskins RN  Outcome: Met This Shift  5/12/2019 1035 by Taina Olivier RN  Outcome: Met This Shift  Goal: Absence of physical injury  Description  Absence of physical injury  5/12/2019 2226 by Abbe Hoskins RN  Outcome: Met This Shift  5/12/2019 1035 by Taina Olivier RN  Outcome: Met This Shift     Problem: Pain:  Goal: Pain level will decrease  Description  Pain level will decrease  5/12/2019 2226 by Abbe Hoskins RN  Outcome: Met This Shift  5/12/2019 1035 by Taina Olivier RN  Outcome: Met This Shift  Goal: Control of acute pain  Description  Control of acute pain  5/12/2019 2226 by Abbe Hoskins RN  Outcome: Met This Shift  5/12/2019 1035 by Taina Olivier RN  Outcome: Met This Shift  Goal: Control of chronic pain  Description  Control of chronic pain  Outcome: Met This Shift     Problem: Skin Integrity:  Goal: Will show no infection signs and symptoms  Description  Will show no infection signs and symptoms  5/12/2019 2226 by Abbe Hoskins RN  Outcome: Met This Shift  5/12/2019 1035 by Taina Olivier RN  Outcome: Met This Shift  Goal: Absence of new skin breakdown  Description  Absence of new skin breakdown  5/12/2019 2226 by Abbe Hoskins RN  Outcome: Met This Shift  5/12/2019 1035 by Taina Olivier RN  Outcome: Met This Shift     Problem: Anxiety:  Goal: Level of anxiety will decrease  Description  Level of anxiety will decrease  5/12/2019 2226 by Abbe Hoskins RN  Outcome: Met This Shift  5/12/2019 1035 by Taina Olivier RN  Outcome: Met This Shift     Problem: Risk for Impaired Skin Integrity  Goal: Tissue integrity - skin and mucous membranes  Description  Structural intactness and normal physiological function of skin and  mucous membranes.   5/12/2019 2226 by Abbe Hoskins RN  Outcome: Met This

## 2019-05-13 NOTE — CARE COORDINATION
Social Work:    Social service called a referral to [employer]. Await their evaluation.     Electronically signed by CRISTA Gamboa on 5/13/2019 at 2:49 PM

## 2019-05-13 NOTE — PROGRESS NOTES
Pod #1    Feels very good    Vitals:    05/13/19 0400   BP: 118/63   Pulse: 74   Resp: 16   Temp: 98.7 °F (37.1 °C)   SpO2: 96%     Dressing rle c/d/i  Calf soft  Intact ehl    hgb 11.4  plt 277    Xray good    Szymanski out  oob and wbat  Looks good  If hgb good tomorrow, ok to d/c

## 2019-05-13 NOTE — PLAN OF CARE
Problem: Falls - Risk of:  Goal: Will remain free from falls  Description  Will remain free from falls  5/13/2019 9825 by Phil Allison RN  Outcome: Met This Shift  5/12/2019 2226 by Phil Allison RN  Outcome: Met This Shift  Goal: Absence of physical injury  Description  Absence of physical injury  5/13/2019 9979 by Phil Allison RN  Outcome: Met This Shift  5/12/2019 2226 by Phil Allison RN  Outcome: Met This Shift     Problem: Pain:  Goal: Pain level will decrease  Description  Pain level will decrease  5/13/2019 0632 by Phil Allison RN  Outcome: Met This Shift  5/12/2019 2226 by Phil Allison RN  Outcome: Met This Shift  Goal: Control of acute pain  Description  Control of acute pain  5/13/2019 0632 by Phil Allison RN  Outcome: Met This Shift  5/12/2019 2226 by Phil Allison RN  Outcome: Met This Shift  Goal: Control of chronic pain  Description  Control of chronic pain  5/13/2019 0632 by Phil Allison RN  Outcome: Met This Shift  5/12/2019 2226 by Phil Allison RN  Outcome: Met This Shift     Problem: Skin Integrity:  Goal: Will show no infection signs and symptoms  Description  Will show no infection signs and symptoms  5/13/2019 0632 by Phil Allison RN  Outcome: Met This Shift  5/12/2019 2226 by Phil Allison RN  Outcome: Met This Shift  Goal: Absence of new skin breakdown  Description  Absence of new skin breakdown  5/13/2019 0632 by Phil Allison RN  Outcome: Met This Shift  5/12/2019 2226 by Phil Allison RN  Outcome: Met This Shift     Problem: Anxiety:  Goal: Level of anxiety will decrease  Description  Level of anxiety will decrease  5/13/2019 0632 by Phil Allison RN  Outcome: Met This Shift  5/12/2019 2226 by Phil Allison RN  Outcome: Met This Shift     Problem: Risk for Impaired Skin Integrity  Goal: Tissue integrity - skin and mucous membranes  Description  Structural intactness and normal physiological function of skin and  mucous membranes.   5/13/2019 5658 by Juan Ng RN  Outcome: Met This Shift  5/12/2019 2226 by Juan Ng RN  Outcome: Met This Shift     Problem: Discharge Planning:  Goal: Knowledge of discharge instructions  Description  Knowledge of discharge instructions  5/13/2019 3793 by Juan Ng RN  Outcome: Met This Shift  5/12/2019 2226 by Juan Ng RN  Outcome: Met This Shift     Problem: Infection - Surgical Site:  Goal: Signs of wound healing will improve  Description  Signs of wound healing will improve  5/13/2019 0632 by Juan Ng RN  Outcome: Met This Shift  5/12/2019 2226 by Juan Ng RN  Outcome: Met This Shift     Problem: Mobility - Impaired:  Goal: Achieve maximum mobility level  Description  Achieve maximum mobility level  5/13/2019 0632 by Juan Ng RN  Outcome: Met This Shift  5/12/2019 2226 by Juan Ng RN  Outcome: Met This Shift

## 2019-05-13 NOTE — PROGRESS NOTES
Educated patient on the importance of Incentive Spirometer, Patient returned demonstration of  1500  Tolerated 1500 cough deep breathing done well. Hourly rounds continued.

## 2019-05-14 VITALS
DIASTOLIC BLOOD PRESSURE: 70 MMHG | RESPIRATION RATE: 16 BRPM | HEART RATE: 77 BPM | OXYGEN SATURATION: 95 % | WEIGHT: 130 LBS | TEMPERATURE: 97.8 F | SYSTOLIC BLOOD PRESSURE: 108 MMHG | HEIGHT: 62 IN | BODY MASS INDEX: 23.92 KG/M2

## 2019-05-14 LAB
HCT VFR BLD CALC: 32.6 % (ref 34–48)
HEMOGLOBIN: 10.7 G/DL (ref 11.5–15.5)
MCH RBC QN AUTO: 33.6 PG (ref 26–35)
MCHC RBC AUTO-ENTMCNC: 32.8 % (ref 32–34.5)
MCV RBC AUTO: 102.5 FL (ref 80–99.9)
PDW BLD-RTO: 13 FL (ref 11.5–15)
PLATELET # BLD: 267 E9/L (ref 130–450)
PMV BLD AUTO: 10.2 FL (ref 7–12)
RBC # BLD: 3.18 E12/L (ref 3.5–5.5)
WBC # BLD: 12.4 E9/L (ref 4.5–11.5)

## 2019-05-14 PROCEDURE — 97110 THERAPEUTIC EXERCISES: CPT

## 2019-05-14 PROCEDURE — 85027 COMPLETE CBC AUTOMATED: CPT

## 2019-05-14 PROCEDURE — 97530 THERAPEUTIC ACTIVITIES: CPT

## 2019-05-14 PROCEDURE — 6370000000 HC RX 637 (ALT 250 FOR IP): Performed by: ORTHOPAEDIC SURGERY

## 2019-05-14 PROCEDURE — 97535 SELF CARE MNGMENT TRAINING: CPT

## 2019-05-14 PROCEDURE — 2580000003 HC RX 258: Performed by: ORTHOPAEDIC SURGERY

## 2019-05-14 PROCEDURE — 36415 COLL VENOUS BLD VENIPUNCTURE: CPT

## 2019-05-14 RX ORDER — PSEUDOEPHEDRINE HCL 30 MG
100 TABLET ORAL DAILY
DISCHARGE
Start: 2019-05-15

## 2019-05-14 RX ADMIN — HYDROCODONE BITARTRATE AND ACETAMINOPHEN 1 TABLET: 5; 325 TABLET ORAL at 08:55

## 2019-05-14 RX ADMIN — BUPROPION HYDROCHLORIDE 150 MG: 150 TABLET, FILM COATED, EXTENDED RELEASE ORAL at 08:55

## 2019-05-14 RX ADMIN — Medication 10 ML: at 08:56

## 2019-05-14 RX ADMIN — ASPIRIN 81 MG: 81 TABLET, COATED ORAL at 08:55

## 2019-05-14 RX ADMIN — DOCUSATE SODIUM 100 MG: 100 CAPSULE, LIQUID FILLED ORAL at 08:56

## 2019-05-14 ASSESSMENT — PAIN DESCRIPTION - PROGRESSION: CLINICAL_PROGRESSION: GRADUALLY WORSENING

## 2019-05-14 ASSESSMENT — PAIN DESCRIPTION - DESCRIPTORS: DESCRIPTORS: ACHING;DISCOMFORT

## 2019-05-14 ASSESSMENT — PAIN DESCRIPTION - LOCATION: LOCATION: HIP

## 2019-05-14 ASSESSMENT — PAIN DESCRIPTION - PAIN TYPE: TYPE: SURGICAL PAIN

## 2019-05-14 ASSESSMENT — PAIN - FUNCTIONAL ASSESSMENT: PAIN_FUNCTIONAL_ASSESSMENT: PREVENTS OR INTERFERES SOME ACTIVE ACTIVITIES AND ADLS

## 2019-05-14 ASSESSMENT — PAIN DESCRIPTION - ONSET: ONSET: GRADUAL

## 2019-05-14 ASSESSMENT — PAIN SCALES - GENERAL
PAINLEVEL_OUTOF10: 2
PAINLEVEL_OUTOF10: 4
PAINLEVEL_OUTOF10: 6

## 2019-05-14 ASSESSMENT — PAIN DESCRIPTION - ORIENTATION: ORIENTATION: RIGHT

## 2019-05-14 ASSESSMENT — PAIN DESCRIPTION - FREQUENCY: FREQUENCY: CONTINUOUS

## 2019-05-14 NOTE — CARE COORDINATION
Social Work:    Social service met with Mrs. Brian Orozco and her daughter Donte Romeo. Social service discussed obtaining private pay ambulette for 11:30 a.m. But patient declined the need, advising that her  (she refers to as her ) will be driving her there. Social work notified Aris Hua at [employer] of transfer time.  (cristi, N-17 completed)    Electronically signed by CRISTA Villasenor on 5/14/2019 at 9:21 AM

## 2019-05-14 NOTE — PLAN OF CARE
Problem: Falls - Risk of:  Goal: Will remain free from falls  Description  Will remain free from falls  Outcome: Met This Shift  Goal: Absence of physical injury  Description  Absence of physical injury  Outcome: Met This Shift     Problem: Pain:  Goal: Pain level will decrease  Description  Pain level will decrease  Outcome: Met This Shift  Goal: Control of acute pain  Description  Control of acute pain  Outcome: Met This Shift  Goal: Control of chronic pain  Description  Control of chronic pain  Outcome: Met This Shift     Problem: Skin Integrity:  Goal: Will show no infection signs and symptoms  Description  Will show no infection signs and symptoms  Outcome: Met This Shift  Goal: Absence of new skin breakdown  Description  Absence of new skin breakdown  Outcome: Met This Shift     Problem: Anxiety:  Goal: Level of anxiety will decrease  Description  Level of anxiety will decrease  Outcome: Met This Shift     Problem: Risk for Impaired Skin Integrity  Goal: Tissue integrity - skin and mucous membranes  Description  Structural intactness and normal physiological function of skin and  mucous membranes.   Outcome: Met This Shift     Problem: Discharge Planning:  Goal: Knowledge of discharge instructions  Description  Knowledge of discharge instructions  Outcome: Met This Shift     Problem: Infection - Surgical Site:  Goal: Signs of wound healing will improve  Description  Signs of wound healing will improve  Outcome: Met This Shift     Problem: Mobility - Impaired:  Goal: Achieve maximum mobility level  Description  Achieve maximum mobility level  Outcome: Met This Shift

## 2019-05-14 NOTE — PROGRESS NOTES
Called Dr. Brad Patrick per RN BS  Reason Need Discharge Orders and I61 signed.   Desirae Mckenzie  5/14/2019  9:01 AM

## 2019-05-14 NOTE — PROGRESS NOTES
break and extended time during tasks. standing ynes x6-7 min with fair plus balance during self care tasks           B UE were WFL during tasks. Comments: Upon arrival pt was sitting in chair. At end of session pt was transferred back to chair with alarm on and all lines and tubes intact, call light within reach. Education: AE for LE ADL and safety training    · Pt has made good progress towards set goals.    · Continue with current plan of care      Total Tx Time: [address]/L [id number]

## 2019-05-14 NOTE — DISCHARGE SUMMARY
Physician Discharge Summary     Patient ID:  Elza Mercado  [id number]  [de-identified] y.o.  1940    Admit date: 2019    Discharge date and time: 2019 9:07 AM      Admission Diagnoses: Intertrochanteric fracture of right femur, closed, initial encounter [facility]) [S72.141A]  Intertrochanteric fracture of right femur, closed, initial encounter ([address] [facility]    Discharge Diagnoses:     1. Fracture right femur status post repair     2. Essential hypertension     3. Relative hypotension    4. Postoperative acute blood loss anemia        Consults: orthopedic surgery    Procedures:   Shaye Jones MD   Physician   Orthopedic Surgery   Op Note   Unsigned Transcription   Date of Service:  2019  9:29 AM            (suggestion)   Draft: Not electronically signed.                  Show:Clear all  [x]Manual[]Template[]Copied    Added by:  [x]Fer Salamanca MD      [facility] for details                   [address] [address]                                 OPERATIVE REPORT     PATIENT NAME: Dee Lomax                      :        1940  MED REC NO:   [id number]                            ROOM:       85  ACCOUNT NO:   [de-identified]                           ADMIT DATE: 2019  PROVIDER:     Shaye Jones MD     DATE OF PROCEDURE:  2019     PREOPERATIVE DIAGNOSIS:  Left four-part intertrochanteric hip fracture,  displaced.     POSTOPERATIVE DIAGNOSIS:  Left four-part intertrochanteric hip fracture,  displaced.     PROCEDURE PERFORMED:  IM nail of right intertrochanteric hip fracture.     SURGEON:  Shaye Jones M.D.     ANESTHESIA:  Spinal and sedation with local.     COMPLICATIONS:  None.     ESTIMATED BLOOD LOSS:  100 mL.     HARDWARE:  [employer] [place] gamma 3, 11 mm x 360 mm length nail with a  95-mm lag screw and a 45-mm distal locking screw.     SPECIMENS:  None.     COMPLICATIONS:  None.     DETAILS OF PROCEDURE:  The patient was identified and brought to the  operating room. The patient, after spinal, was placed on the fracture  table. Her well leg was flexed, abducted, and secured into a padded leg  well. Her operative leg was placed in gentle in-line traction and  visualized in various projections. It was noticed that the _____  transtrochanteric component as well as a comminuted greater trochanter  and the lesser was off as well as the intertrochanteric head and neck  component. On the lateral, the head and neck were anterior to the shaft  and did not reduce with elevation. When this was performed, it just  further elevated the neck because the fracture was engaged. At this  point, the right leg was prepped and draped in a sterile fashion. Standard stab incision was made proximal to the greater trochanter and a  guidewire was placed into its tip. It was advanced into the proximal  femur, confirmed under fluoroscopy, and a one-step drill bit and guide  were used to open up the proximal femur. Guidewire was passed, it was  measured. The canal was tight and it was sequentially reamed to 12.5 to  get the 11-mm nail down. At this point, the nail in the handle and the  insertion handle were used a reduction tool to disengage the  intertrochanteric region from the neck to elevate it up and then  compress it together manually to the handle. A second incision was made  and a guidewire was placed in the central aspect of the femoral head and  neck and this lined up the intertrochanteric area, so that it was  anatomic. Rotation was set. Lag screw was measured, reamed, and filled  appropriately. Traction was released. It was compressed together and  derotational set screw was placed appropriately showing anatomic  alignment of the proximal femur. The two incisions proximally were  copiously irrigated and closed in layers with staples in the skin.   The  leg was abducted and a freehand distal locking screw was placed without  any difficulty. Hardcopy fluoroscopic pictures were saved in the AP and  lateral projection of the proximal aspect and distal aspect of the nail  showing placement.     POSTOPERATIVE PLAN:  She will weightbear as tolerated on her right lower  extremity. We will use a baby aspirin for DVT prophylaxis. She will be  discharged on half to one Norco 5/325 tablet q.6 hours p.r.n. for pain,  number 28. She will follow up in the office in two weeks' time with  x-rays of her right femur.           Glenn Castro MD     D: 05/12/2019 9:27:58       T: 05/12/2019 9:29:49     [email or web address]  Job#: [id number]     Doc#: [id number]     CC: Cesar Fulton DO                Laboratory:   Last 3 BMP  Recent Labs     05/12/19  1435 05/13/19  0500    135   K 4.7 4.6   CL 96* 99   CO2 25 25   BUN 22 16   CREATININE 0.5 0.5   GLUCOSE 211* 154*   CALCIUM 8.7 8.5*       Last 3 CBC:  Recent Labs     05/13/19  0500 05/14/19  0331   WBC 11.5 12.4*   RBC 3.42* 3.18*   HGB 11.4* 10.7*   HCT 34.6 32.6*   .2* 102.5*   MCH 33.3 33.6   MCHC 32.9 32.8   RDW 12.6 13.0    267   MPV 10.4 10.2           Hospital Course:     Patient admitted with fractured hip and seen by orthopedic surgery and taken for operative repair; postoperative course, gated by relative hypotension and patient's antihypertensive regimen placed on hold. Patient improved clinically and was discharged to subacute rehab in stable condition with an adjusted medical regimen due to relative hypotension. Patient will follow-up with orthopedic surgery as per their recommendations.     Discharge Exam:  See progress note from today    Disposition: SNF    Patient Instructions:   Current Discharge Medication List      START taking these medications    Details   docusate sodium (COLACE, DULCOLAX) 100 MG CAPS Take 100 mg by mouth daily      HYDROcodone-acetaminophen (NORCO) 5-325 MG per tablet Take 0.5-1 tablets by mouth every 6 hours as needed for Pain for up to 7 days.  Demarco Isaac: 28 tablet, Refills: 0    Comments: Reduce doses taken as pain becomes manageable  Associated Diagnoses: Closed displaced intertrochanteric fracture of right femur, initial encounter ([facility])      aspirin (ASPIR-LOW) 81 MG EC tablet Take 1 tablet by mouth daily  Qty: 30 tablet, Refills: 0         CONTINUE these medications which have NOT CHANGED    Details   latanoprost (XALATAN) 0.005 % ophthalmic solution Place 1 drop into both eyes daily      amLODIPine (NORVASC) 5 MG tablet Take 5 mg by mouth daily      buPROPion (WELLBUTRIN XL) 150 MG extended release tablet Take 150 mg by mouth every morning         STOP taking these medications       benazepril (LOTENSIN) 10 MG tablet Comments:   Reason for Stopping:             Activity: activity as tolerated  Diet: regular diet    Follow-up with Dr Olivia Chery in 1 week. Note that over 30 minutes was spent in preparing discharge papers, discussing discharge with patient, medication review, etc.    Signed:  JOSEF Lawler  5/14/2019  9:07 AM

## 2019-05-14 NOTE — PROGRESS NOTES
Pod #2      Vitals:    05/14/19 0824   BP: 108/70   Pulse: 77   Resp: 16   Temp: 97.8 °F (36.6 °C)   SpO2: 95%     hgb 10.7    Ok for transfer

## 2019-05-14 NOTE — DISCHARGE INSTR - COC
Continuity of Care Form    Patient Name: Jorge Marsh   :  1940  MRN:  [id number]    Admit date:  2019  Discharge date:  19    Code Status Order: Full Code   Advance Directives:   Advance Care Flowsheet Documentation     Date/Time Healthcare Directive Type of Healthcare Directive Copy in [address] Agent's Name Healthcare Agent's Phone Number    19 0142  No, patient does not have an advance directive for healthcare treatment -- -- -- -- --    19 1133  No, patient does not have an advance directive for healthcare treatment -- -- -- -- --          Admitting Physician:  Diana Hager DO  PCP: Alice Quintero DO    Discharging Nurse: [address] Unit/Room#: 4044/9630-A  Discharging Unit Phone Number:     Emergency Contact:   Extended Emergency Contact Information  Primary Emergency Contact: Pola Galeazzi [address] Phone: [phone]  Relation: Child    Past Surgical History:  Past Surgical History:   Procedure Laterality Date    HIP FRACTURE SURGERY Right 2019    HIP OPEN REDUCTION INTERNAL FIXATION performed by Marcela Jackson MD at [address] [place] [address] History: There is no immunization history on file for this patient.     Active Problems:  Patient Active Problem List   Diagnosis Code    Intertrochanteric fracture of right femur, closed, initial encounter ([facility] S72.141A    Hypertension I10       Isolation/Infection:   Isolation          No Isolation            Nurse Assessment:  Last Vital Signs: /70   Pulse 77   Temp 97.8 °F (36.6 °C) (Oral)   Resp 16   Ht 5' 2\" (1.575 m)   Wt 130 lb (59 kg)   SpO2 95%   BMI 23.78 kg/m²     Last documented pain score (0-10 scale): Pain Level: 6  Last Weight:   Wt Readings from Last 1 Encounters:   19 130 lb (59 kg)     Mental Status:  oriented and alert    IV Access:  - None    Nursing Mobility/ADLs:  Walking Assisted  Transfer  Assisted  Bathing  Assisted  Dressing  Assisted  Toileting  Assisted  Feeding  Assisted  Med Admin  Independent  Med Delivery   whole    Wound Care Documentation and Therapy:        Elimination:  Continence:   · Bowel: Yes  · Bladder: Yes  Urinary Catheter: None   Colostomy/Ileostomy/Ileal Conduit: No       Date of Last BM: Unknown    Intake/Output Summary (Last 24 hours) at 5/14/2019 0906  Last data filed at 5/13/2019 2136  Gross per 24 hour   Intake 640 ml   Output --   Net 640 ml     I/O last 3 completed shifts: In: 640 [P.O.:640]  Out: -     Safety Concerns:     History of Falls (last 30 days) and At Risk for Falls    Impairments/Disabilities:      None    Nutrition Therapy:  Current Nutrition Therapy:   - Oral Diet:  General    Routes of Feeding: Oral  Liquids: No Restrictions  Daily Fluid Restriction: no  Last Modified Barium Swallow with Video (Video Swallowing Test): not done    Treatments at the Time of Hospital Discharge:   Respiratory Treatments: none  Oxygen Therapy:  is not on home oxygen therapy.   Ventilator:    - No ventilator support    Rehab Therapies: Physical Therapy and Occupational Therapy  Weight Bearing Status/Restrictions: No weight bearing restirctions  Other Medical Equipment (for information only, NOT a DME order):  walker  Other Treatments: None    Patient's personal belongings (please select all that are sent with patient):  None    RN SIGNATURE:  Electronically signed by Lachelle Quintana RN on 5/14/19 at 11:03 AM    CASE MANAGEMENT/SOCIAL WORK SECTION    Inpatient Status Date: ***    Readmission Risk Assessment Score:  Readmission Risk              Risk of Unplanned Readmission:        11           Discharging to Facility/ Agency   · Name: [facility]  · Address:  · Phone: [phone]  · Fax:[phone]    Dialysis Facility (if applicable)   · Name:  · Address:  · Dialysis Schedule:  · Phone:  · Fax:    / signature: Electronically signed by CRISTA Hua on 5/14/2019 at 9:16 AM    PHYSICIAN SECTION    Prognosis: {Prognosis:3253880268}    Condition at Discharge: [address] Patient Condition:[id number]}    Rehab Potential (if transferring to Rehab): {Prognosis:9469978585}    Recommended Labs or Other Treatments After Discharge: ***    Lotensin on hold due to relative hypotension    Hold amlodipine for systolic less than 138        Physician Certification: I certify the above information and transfer of Tre Castañeda  is necessary for the continuing treatment of the diagnosis listed and that she requires Igor Junior for less 30 days.      Update Admission H&P: {CHP DME Changes in GDOGL:[id number]}    PHYSICIAN SIGNATURE:  Electronically signed by Leeann Burrows DO on 5/14/19 at 9:06 AM

## 2019-05-20 NOTE — PROGRESS NOTES
Patient called stating she fell and broke hip. She had hip surgery and is in rehab. She will contact [facility] to schedule breast MRI when she is able.

## 2019-07-17 ENCOUNTER — HOSPITAL ENCOUNTER (OUTPATIENT)
Age: 79
Discharge: HOME | End: 2019-07-17
Payer: MEDICARE

## 2019-07-17 LAB
BUN BLDV-MCNC: 15 MG/DL (ref 8–23)
CREAT SERPL-MCNC: 0.8 MG/DL (ref 0.5–1)
GFR AFRICAN AMERICAN: >60
GFR NON-AFRICAN AMERICAN: >60 ML/MIN/1.73

## 2019-07-17 PROCEDURE — 82565 ASSAY OF CREATININE: CPT

## 2019-07-17 PROCEDURE — 84520 ASSAY OF UREA NITROGEN: CPT

## 2019-07-17 PROCEDURE — 36415 COLL VENOUS BLD VENIPUNCTURE: CPT

## 2019-07-23 ENCOUNTER — HOSPITAL ENCOUNTER (OUTPATIENT)
Dept: MRI IMAGING | Age: 79
Discharge: HOME | End: 2019-07-25
Payer: MEDICARE

## 2019-07-23 DIAGNOSIS — R92.8 ABNORMAL MAMMOGRAM: ICD-10-CM

## 2019-07-23 PROCEDURE — A9577 INJ MULTIHANCE: HCPCS | Performed by: RADIOLOGY

## 2019-07-23 PROCEDURE — 6360000004 HC RX CONTRAST MEDICATION: Performed by: RADIOLOGY

## 2019-07-23 PROCEDURE — 77049 MRI BREAST C-+ W/CAD BI: CPT

## 2019-07-23 RX ADMIN — GADOBENATE DIMEGLUMINE 20 ML: 529 INJECTION, SOLUTION INTRAVENOUS at 11:25

## 2019-07-24 ENCOUNTER — TELEPHONE (OUTPATIENT)
Dept: GENERAL RADIOLOGY | Age: 79
End: 2019-07-24

## 2019-08-13 ENCOUNTER — HOSPITAL ENCOUNTER (OUTPATIENT)
Dept: MRI IMAGING | Age: 79
Discharge: HOME | End: 2019-08-15
Payer: MEDICARE

## 2019-08-13 ENCOUNTER — HOSPITAL ENCOUNTER (OUTPATIENT)
Dept: GENERAL RADIOLOGY | Age: 79
Discharge: HOME | End: 2019-08-15
Payer: MEDICARE

## 2019-08-13 DIAGNOSIS — R93.89 ABNORMAL MRI: ICD-10-CM

## 2019-08-13 DIAGNOSIS — Z98.890 STATUS POST BIOPSY: ICD-10-CM

## 2019-08-13 PROCEDURE — A9577 INJ MULTIHANCE: HCPCS | Performed by: RADIOLOGY

## 2019-08-13 PROCEDURE — 77065 DX MAMMO INCL CAD UNI: CPT

## 2019-08-13 PROCEDURE — 88342 IMHCHEM/IMCYTCHM 1ST ANTB: CPT

## 2019-08-13 PROCEDURE — 88341 IMHCHEM/IMCYTCHM EA ADD ANTB: CPT

## 2019-08-13 PROCEDURE — 2720000010 MRI BIOPSY BREAST W LOC DEVICE LEFT 1ST LESION

## 2019-08-13 PROCEDURE — 6360000004 HC RX CONTRAST MEDICATION: Performed by: RADIOLOGY

## 2019-08-13 PROCEDURE — 88305 TISSUE EXAM BY PATHOLOGIST: CPT

## 2019-08-13 RX ADMIN — GADOBENATE DIMEGLUMINE 20 ML: 529 INJECTION, SOLUTION INTRAVENOUS at 12:49

## 2019-08-16 ENCOUNTER — TELEPHONE (OUTPATIENT)
Dept: GENERAL RADIOLOGY | Age: 79
End: 2019-08-16

## 2019-09-17 ENCOUNTER — HOSPITAL ENCOUNTER (OUTPATIENT)
Dept: GENERAL RADIOLOGY | Age: 79
Discharge: HOME | End: 2019-09-19
Payer: MEDICARE

## 2019-09-17 ENCOUNTER — HOSPITAL ENCOUNTER (OUTPATIENT)
Age: 79
Discharge: HOME | End: 2019-09-19
Payer: MEDICARE

## 2019-09-17 DIAGNOSIS — M25.561 RIGHT KNEE PAIN, UNSPECIFIED CHRONICITY: ICD-10-CM

## 2019-09-17 PROCEDURE — 73564 X-RAY EXAM KNEE 4 OR MORE: CPT

## 2020-11-20 ENCOUNTER — HOSPITAL ENCOUNTER (OUTPATIENT)
Dept: MAMMOGRAPHY | Age: 80
Discharge: HOME | End: 2020-11-22
Payer: MEDICARE

## 2020-11-20 ENCOUNTER — HOSPITAL ENCOUNTER (OUTPATIENT)
Age: 80
Discharge: HOME | End: 2020-11-22
Payer: MEDICARE

## 2020-11-20 PROCEDURE — 77063 BREAST TOMOSYNTHESIS BI: CPT

## 2020-12-01 ENCOUNTER — TELEPHONE (OUTPATIENT)
Dept: GENERAL RADIOLOGY | Age: 80
End: 2020-12-01

## 2020-12-01 NOTE — TELEPHONE ENCOUNTER
Call to patient in reference to her mammogram performed at [place] on  November 20, 2020. Instructed patient that the radiologist has recommended some additional breast imaging, in order to make a final determination/result. Verbalizes understanding and is agreeable to proceed. Appointment provided.        Ciera Vera RN, BSN, [address]

## 2020-12-11 ENCOUNTER — HOSPITAL ENCOUNTER (OUTPATIENT)
Dept: GENERAL RADIOLOGY | Age: 80
Discharge: HOME | End: 2020-12-13
Payer: MEDICARE

## 2020-12-11 PROCEDURE — 76642 ULTRASOUND BREAST LIMITED: CPT

## 2020-12-11 PROCEDURE — G0279 TOMOSYNTHESIS, MAMMO: HCPCS

## 2021-05-20 ENCOUNTER — HOSPITAL ENCOUNTER (OUTPATIENT)
Dept: GENERAL RADIOLOGY | Age: 81
Discharge: HOME | End: 2021-05-22
Payer: MEDICARE

## 2021-05-20 ENCOUNTER — HOSPITAL ENCOUNTER (OUTPATIENT)
Age: 81
Discharge: HOME | End: 2021-05-22
Payer: MEDICARE

## 2021-05-20 DIAGNOSIS — R06.00 DYSPNEA, UNSPECIFIED TYPE: ICD-10-CM

## 2021-05-20 PROCEDURE — 71046 X-RAY EXAM CHEST 2 VIEWS: CPT

## 2021-11-22 ENCOUNTER — HOSPITAL ENCOUNTER (OUTPATIENT)
Age: 81
Discharge: HOME | End: 2021-11-24
Payer: MEDICARE

## 2021-11-22 ENCOUNTER — HOSPITAL ENCOUNTER (OUTPATIENT)
Dept: MAMMOGRAPHY | Age: 81
Discharge: HOME | End: 2021-11-24
Payer: MEDICARE

## 2021-11-22 DIAGNOSIS — Z12.31 ENCOUNTER FOR SCREENING MAMMOGRAM FOR MALIGNANT NEOPLASM OF BREAST: ICD-10-CM

## 2021-11-22 PROCEDURE — 77063 BREAST TOMOSYNTHESIS BI: CPT

## 2023-01-09 ENCOUNTER — HOSPITAL ENCOUNTER (OUTPATIENT)
Dept: MAMMOGRAPHY | Age: 83
Discharge: HOME | End: 2023-01-11
Payer: MEDICARE

## 2023-01-09 DIAGNOSIS — Z12.31 ENCOUNTER FOR SCREENING MAMMOGRAM FOR MALIGNANT NEOPLASM OF BREAST: ICD-10-CM

## 2023-01-09 PROCEDURE — 77063 BREAST TOMOSYNTHESIS BI: CPT

## 2023-01-12 ENCOUNTER — CLINICAL DOCUMENTATION (OUTPATIENT)
Dept: GENERAL RADIOLOGY | Age: 83
End: 2023-01-12

## 2023-01-12 NOTE — PROGRESS NOTES
Per Dr Darryl Berumen office, patient going to [facility] for additional breast imaging.   (Order scanned in under media.)

## 2024-12-03 LAB
ALBUMIN SERPL-MCNC: 4 G/DL (ref 3.5–5.2)
ALP SERPL-CCNC: 61 U/L (ref 35–104)
ALT SERPL-CCNC: 15 U/L (ref 0–32)
ANION GAP SERPL CALCULATED.3IONS-SCNC: 10 MMOL/L (ref 7–16)
AST SERPL-CCNC: 21 U/L (ref 0–31)
BASOPHILS # BLD: 0.04 K/UL (ref 0–0.2)
BASOPHILS NFR BLD: 1 % (ref 0–2)
BILIRUB SERPL-MCNC: 0.2 MG/DL (ref 0–1.2)
BILIRUB UR QL STRIP: NEGATIVE
BUN SERPL-MCNC: 14 MG/DL (ref 6–23)
CALCIUM SERPL-MCNC: 9.3 MG/DL (ref 8.6–10.2)
CHLORIDE SERPL-SCNC: 103 MMOL/L (ref 98–107)
CHOLEST SERPL-MCNC: 204 MG/DL
CLARITY UR: CLEAR
CO2 SERPL-SCNC: 27 MMOL/L (ref 22–29)
COLOR UR: YELLOW
COMMENT: NORMAL
CREAT SERPL-MCNC: 0.7 MG/DL (ref 0.5–1)
EOSINOPHIL # BLD: 0.12 K/UL (ref 0.05–0.5)
EOSINOPHILS RELATIVE PERCENT: 2 % (ref 0–6)
ERYTHROCYTE [DISTWIDTH] IN BLOOD BY AUTOMATED COUNT: 13.1 % (ref 11.5–15)
GFR, ESTIMATED: 84 ML/MIN/1.73M2
GLUCOSE SERPL-MCNC: 87 MG/DL (ref 74–99)
GLUCOSE UR STRIP-MCNC: NEGATIVE MG/DL
HCT VFR BLD AUTO: 45.6 % (ref 34–48)
HDLC SERPL-MCNC: 59 MG/DL
HGB BLD-MCNC: 14.7 G/DL (ref 11.5–15.5)
HGB UR QL STRIP.AUTO: NEGATIVE
IMM GRANULOCYTES # BLD AUTO: <0.03 K/UL (ref 0–0.58)
IMM GRANULOCYTES NFR BLD: 0 % (ref 0–5)
KETONES UR STRIP-MCNC: NEGATIVE MG/DL
LDLC SERPL CALC-MCNC: 123 MG/DL
LEUKOCYTE ESTERASE UR QL STRIP: NEGATIVE
LYMPHOCYTES NFR BLD: 1.92 K/UL (ref 1.5–4)
LYMPHOCYTES RELATIVE PERCENT: 34 % (ref 20–42)
MCH RBC QN AUTO: 34.3 PG (ref 26–35)
MCHC RBC AUTO-ENTMCNC: 32.2 G/DL (ref 32–34.5)
MCV RBC AUTO: 106.3 FL (ref 80–99.9)
MONOCYTES NFR BLD: 0.62 K/UL (ref 0.1–0.95)
MONOCYTES NFR BLD: 11 % (ref 2–12)
NEUTROPHILS NFR BLD: 52 % (ref 43–80)
NEUTS SEG NFR BLD: 2.96 K/UL (ref 1.8–7.3)
NITRITE UR QL STRIP: NEGATIVE
PH UR STRIP: 7 [PH] (ref 5–9)
PLATELET # BLD AUTO: 381 K/UL (ref 130–450)
PMV BLD AUTO: 10.5 FL (ref 7–12)
POTASSIUM SERPL-SCNC: 4.4 MMOL/L (ref 3.5–5)
PROT SERPL-MCNC: 6.2 G/DL (ref 6.4–8.3)
PROT UR STRIP-MCNC: NEGATIVE MG/DL
RBC # BLD AUTO: 4.29 M/UL (ref 3.5–5.5)
SODIUM SERPL-SCNC: 140 MMOL/L (ref 132–146)
SP GR UR STRIP: 1.02 (ref 1–1.03)
TRIGL SERPL-MCNC: 112 MG/DL
TSH SERPL DL<=0.05 MIU/L-ACNC: 1.42 UIU/ML (ref 0.27–4.2)
UROBILINOGEN UR STRIP-ACNC: 0.2 EU/DL (ref 0–1)
VLDLC SERPL CALC-MCNC: 22 MG/DL
WBC OTHER # BLD: 5.7 K/UL (ref 4.5–11.5)

## (undated) DEVICE — 3M™ IOBAN™ 2 ANTIMICROBIAL INCISE DRAPE 6650EZ: Brand: IOBAN™ 2

## (undated) DEVICE — PADDING CAST W4INXL4YD SPUN DACRON POLY POR SYN VERSATILE

## (undated) DEVICE — CHLORAPREP 26ML ORANGE

## (undated) DEVICE — 4-PORT MANIFOLD: Brand: NEPTUNE 2

## (undated) DEVICE — TAPE ADH CLTH SILK H2O REPELLENT CURAD

## (undated) DEVICE — PAD PERINL POST FOAM DISPOSABLE FOR AMSCO SURG TBL

## (undated) DEVICE — COVER DSG W7 7 8XL11IN WHT POR CLTH PRECUT WTRPRF MEDIPORE

## (undated) DEVICE — DRILL, AO, STERILE

## (undated) DEVICE — LOCKING SCREW, FULLY THREADED
Type: IMPLANTABLE DEVICE | Site: FEMUR | Status: NON-FUNCTIONAL
Removed: 2019-05-12

## (undated) DEVICE — GUIDE WIRE, BALL-TIPPED, STERILE

## (undated) DEVICE — DRAPE C ARM W41XL74IN UNIV MOB W RUBBERBAND CLP

## (undated) DEVICE — TOWEL,OR,DSP,ST,BLUE,STD,6/PK,12PK/CS: Brand: MEDLINE

## (undated) DEVICE — SUTURE VCRL SZ 3-0 L18IN ABSRB UD PS-2 L19MM 1/2 CIR J497G

## (undated) DEVICE — K-WIRE

## (undated) DEVICE — PAD,ABDOMINAL,5"X9",ST,LF,25/BX: Brand: MEDLINE INDUSTRIES, INC.

## (undated) DEVICE — TAPE ADH W3INXL10YD WHT COT WVN BK POWERFUL RUB BASE HIGHLY

## (undated) DEVICE — GOWN,SIRUS,FABRNF,XL,20/CS: Brand: MEDLINE

## (undated) DEVICE — Device

## (undated) DEVICE — PADDING,UNDERCAST,COTTON, 4"X4YD STERILE: Brand: MEDLINE

## (undated) DEVICE — DRESSING FOAM W6.3XL7.9IN TAN SACR SELF ADH MEPILEX BORD

## (undated) DEVICE — STANDARD HYPODERMIC NEEDLE,POLYPROPYLENE HUB: Brand: MONOJECT

## (undated) DEVICE — INTENDED FOR TISSUE SEPARATION, AND OTHER PROCEDURES THAT REQUIRE A SHARP SURGICAL BLADE TO PUNCTURE OR CUT.: Brand: BARD-PARKER ® STAINLESS STEEL BLADES

## (undated) DEVICE — DOUBLE BASIN SET: Brand: MEDLINE INDUSTRIES, INC.

## (undated) DEVICE — TUBING, SUCTION, 9/32" X 10', STRAIGHT: Brand: MEDLINE

## (undated) DEVICE — DRAPE PATIENT ISOL IRRIG POUCH

## (undated) DEVICE — GAUZE,SPONGE,4"X4",8PLY,STRL,LF,10/TRAY: Brand: MEDLINE

## (undated) DEVICE — PACK PROCEDURE SURG GEN CUST

## (undated) DEVICE — 2956 MEDIPORE PRE-CUT DSG CVR 15CMX15CM: Brand: 3M COMPANY

## (undated) DEVICE — DRESSING,GAUZE,XEROFORM,CURAD,1"X8",ST: Brand: CURAD